# Patient Record
Sex: FEMALE | Race: WHITE | Employment: OTHER | URBAN - METROPOLITAN AREA
[De-identification: names, ages, dates, MRNs, and addresses within clinical notes are randomized per-mention and may not be internally consistent; named-entity substitution may affect disease eponyms.]

---

## 2019-04-23 ENCOUNTER — OFFICE VISIT (OUTPATIENT)
Dept: OBGYN CLINIC | Age: 74
End: 2019-04-23

## 2019-04-23 VITALS
WEIGHT: 189 LBS | HEIGHT: 63 IN | SYSTOLIC BLOOD PRESSURE: 124 MMHG | BODY MASS INDEX: 33.49 KG/M2 | DIASTOLIC BLOOD PRESSURE: 62 MMHG

## 2019-04-23 DIAGNOSIS — Z01.419 ENCOUNTER FOR ANNUAL ROUTINE GYNECOLOGICAL EXAMINATION: Primary | ICD-10-CM

## 2019-04-23 RX ORDER — GUAIFENESIN 100 MG/5ML
81 LIQUID (ML) ORAL DAILY
COMMUNITY

## 2019-04-23 RX ORDER — BIMATOPROST 0.1 MG/ML
SOLUTION/ DROPS OPHTHALMIC
Refills: 12 | COMMUNITY
Start: 2019-02-27

## 2019-04-23 NOTE — PATIENT INSTRUCTIONS

## 2019-04-23 NOTE — PROGRESS NOTES
[address] [facility]  [email or web address]  [phone]    Myrla Runner, MD, [address] [facility]       Annual Gynecologic Exam:   [facility] 60+  Chief Complaint   Patient presents with    Well Woman         Buffy Meléndez is a 72 y.o. No obstetric history on file. WHITE OR   female who presents for an annual exam.    She does not report additional concerns today. Sexual history and Contraception:  Social History     Substance and Sexual Activity   Sexual Activity Not Currently    Partners: Male    Comment: last SA- 2008     She does not reports new sexual partner(s) in the last year. Preventive Medicine History:  Her most recent Pap smear result: normal was obtained in 2015    She does not have a history of DOMINGO 2, 3 or cervical cancer. Breast health:  Last mammogram: was normal.   A mammogram was scheduled for today. Breast cancer family updated: see . Bone health: Nato Kadyandres She has had a bone density scan in the past. - 2010  Last bone density test results: within normal limits. She does not have a history of osteopenia/osteoporosis. Osteoporosis family history updated: see . Past Medical History:   Diagnosis Date    Glaucoma      Past Surgical History:   Procedure Laterality Date    HX ORTHOPAEDIC  1989    lower back surgery    HX ORTHOPAEDIC  2003    cervical neck surgery     History reviewed. No pertinent family history.   Social History     Socioeconomic History    Marital status:      Spouse name: Not on file    Number of children: Not on file    Years of education: Not on file    Highest education level: Not on file   Occupational History    Not on file   Social Needs    Financial resource strain: Not on file    Food insecurity:     Worry: Not on file     Inability: Not on file    Transportation needs:     Medical: Not on file     Non-medical: Not on file   Tobacco Use    Smoking status: Never Smoker    Smokeless tobacco: Never Used   Substance and Sexual Activity    Alcohol use: Never     Frequency: Never    Drug use: Never    Sexual activity: Not Currently     Partners: Male     Comment: last SA- 2008   Lifestyle    Physical activity:     Days per week: Not on file     Minutes per session: Not on file    Stress: Not on file   Relationships    Social connections:     Talks on phone: Not on file     Gets together: Not on file     Attends Rastafarian service: Not on file     Active member of club or organization: Not on file     Attends meetings of clubs or organizations: Not on file     Relationship status: Not on file    Intimate partner violence:     Fear of current or ex partner: Not on file     Emotionally abused: Not on file     Physically abused: Not on file     Forced sexual activity: Not on file   Other Topics Concern    Not on file   Social History Narrative    Not on file       Allergies   Allergen Reactions    Penicillins Anaphylaxis       Current Outpatient Medications   Medication Sig    LUMIGAN 0.01 % ophthalmic drops INSTILL 1 DROP INTO BOTH EYES EVERY DAY AT NIGHT    aspirin 81 mg chewable tablet Take 81 mg by mouth every seven (7) days. No current facility-administered medications for this visit. There is no problem list on file for this patient.       Review of Systems - History obtained from the patient  Constitutional: negative for weight loss, fever, night sweats  HEENT: negative for hearing loss, earache, congestion, snoring, sorethroat  CV: negative for chest pain, palpitations, edema  Resp: negative for cough, shortness of breath, wheezing  GI: negative for change in bowel habits, abdominal pain, black or bloody stools  : negative for frequency, dysuria, hematuria, vaginal discharge  MSK: negative for back pain, joint pain, muscle pain  Breast: negative for breast lumps, nipple discharge, galactorrhea  Skin :negative for itching, rash, hives  Neuro: negative for dizziness, headache, confusion, weakness  Psych: negative for anxiety, depression, change in mood  Heme/lymph: negative for bleeding, bruising, pallor    Physical Exam  Visit Vitals  /62   Ht 5' 3\" (1.6 m)   Wt 189 lb (85.7 kg)   BMI 33.48 kg/m²       Constitutional  · Appearance: well-nourished, well developed, alert, in no acute distress    HENT  · Head and Face: appears normal    Neck  · Inspection/Palpation: normal appearance, no masses or tenderness  · Lymph Nodes: no lymphadenopathy present  · Thyroid: gland size normal, nontender, no nodules or masses present on palpation    Chest  · Respiratory Effort: breathing unlabored  · Auscultation: normal breath sounds    Cardiovascular  · Heart:  · Auscultation: regular rate and rhythm without murmur    Breasts  · Inspection of Breasts: breasts symmetrical, no skin changes, no discharge present, nipple appearance normal, no skin retraction present  · Palpation of Breasts and Axillae: no masses present on palpation, no breast tenderness  · Axillary Lymph Nodes: no lymphadenopathy present    Gastrointestinal  · Abdominal Examination: abdomen non-tender to palpation, normal bowel sounds, no masses present  · Liver and spleen: no hepatomegaly present, spleen not palpable  · Hernias: no hernias identified    Genitourinary  · External Genitalia: normal appearance for age, no discharge present, no tenderness present, no inflammatory lesions present, no masses present, with atrophy present  · Vagina: normal vaginal vault without central or paravaginal defects, no discharge present, no inflammatory lesions present, no masses present  · Bladder: non-tender to palpation  · Urethra: appears normal  · Cervix: normal   · Uterus: normal size, shape and consistency  · Adnexa: no adnexal tenderness present, no adnexal masses present  · Perineum: perineum within normal limits, no evidence of trauma, no rashes or skin lesions present  · Anus: anus within normal limits, no hemorrhoids present  · Inguinal Lymph Nodes: no lymphadenopathy present    Skin  · General Inspection: no rash, no lesions identified    Neurologic/Psychiatric  · Mental Status:  · Orientation: grossly oriented to person, place and time  · Mood and Affect: mood normal, affect appropriate    Assessment:  72 y.o. No obstetric history on file. for well woman exam  Encounter Diagnosis   Name Primary?  Encounter for annual routine gynecological examination Yes       Plan:  The patient was counseled about diet, exercise, healthy lifestyle  We discussed current self breast exam and mammogram recommendations  We discussed current pap smear and HR HPV testing guidelines  We recommend follow up in one year for routine annual gynecologic exam or sooner if needed  We recommend follow up with a primary care physician for any chronic medical problems or non-gynecologic concerns    We discussed calcium/vitamin D/weight bearing exercise and osteoporosis prevention and bone density screening recommendations. Handouts were given to the patient    Rec BMD: pt will check w PCP  Disc pap screening: will try to get previous pap records [facility])  Pt req pap today     Folllow up:  [x] return for annual well woman exam in one year or sooner if she is having problems  [] follow up and ultrasound  [] mammogram  [] 6 months  [] 6 weeks   []     Orders Placed This Encounter    PAP IG, RFX APTIMA HPV ASCUS ([id number]))       No results found for any visits on 04/23/19.

## 2019-04-25 LAB
CYTOLOGIST CVX/VAG CYTO: NORMAL
CYTOLOGY CVX/VAG DOC THIN PREP: NORMAL
CYTOLOGY HISTORY:: NORMAL
DX ICD CODE: NORMAL
LABCORP, 190119: NORMAL
Lab: NORMAL
OTHER STN SPEC: NORMAL
PATH REPORT.FINAL DX SPEC: NORMAL
STAT OF ADQ CVX/VAG CYTO-IMP: NORMAL

## 2021-12-10 ENCOUNTER — TRANSCRIBE ORDER (OUTPATIENT)
Dept: SCHEDULING | Age: 76
End: 2021-12-10

## 2021-12-10 DIAGNOSIS — R42 DIZZINESS AND GIDDINESS: Primary | ICD-10-CM

## 2021-12-13 ENCOUNTER — HOSPITAL ENCOUNTER (INPATIENT)
Age: 76
LOS: 1 days | Discharge: HOME | DRG: 065 | End: 2021-12-15
Attending: EMERGENCY MEDICINE | Admitting: INTERNAL MEDICINE
Payer: MEDICARE

## 2021-12-13 DIAGNOSIS — I67.2 CEREBRAL ATHEROSCLEROSIS: ICD-10-CM

## 2021-12-13 DIAGNOSIS — E78.2 MIXED HYPERLIPIDEMIA: ICD-10-CM

## 2021-12-13 DIAGNOSIS — I63.9 ACUTE CVA (CEREBROVASCULAR ACCIDENT) (HCC): ICD-10-CM

## 2021-12-13 DIAGNOSIS — R42 DIZZINESS: ICD-10-CM

## 2021-12-13 DIAGNOSIS — R55 SYNCOPE AND COLLAPSE: Primary | ICD-10-CM

## 2021-12-13 LAB
ALBUMIN SERPL-MCNC: 3.9 G/DL (ref 3.5–5)
ALBUMIN/GLOB SERPL: 1 {RATIO} (ref 1.1–2.2)
ALP SERPL-CCNC: 86 U/L (ref 45–117)
ALT SERPL-CCNC: 13 U/L (ref 12–78)
ANION GAP SERPL CALC-SCNC: 10 MMOL/L (ref 5–15)
APPEARANCE UR: CLEAR
AST SERPL-CCNC: 15 U/L (ref 15–37)
BACTERIA URNS QL MICRO: NEGATIVE /HPF
BASOPHILS # BLD: 0.1 K/UL (ref 0–0.1)
BASOPHILS NFR BLD: 1 % (ref 0–1)
BILIRUB SERPL-MCNC: 0.5 MG/DL (ref 0.2–1)
BILIRUB UR QL: NEGATIVE
BUN SERPL-MCNC: 7 MG/DL (ref 6–20)
BUN/CREAT SERPL: 8 (ref 12–20)
CALCIUM SERPL-MCNC: 10.2 MG/DL (ref 8.5–10.1)
CHLORIDE SERPL-SCNC: 99 MMOL/L (ref 97–108)
CO2 SERPL-SCNC: 29 MMOL/L (ref 21–32)
COLOR UR: NORMAL
COMMENT, HOLDF: NORMAL
CREAT SERPL-MCNC: 0.91 MG/DL (ref 0.55–1.02)
DIFFERENTIAL METHOD BLD: ABNORMAL
EOSINOPHIL # BLD: 0.2 K/UL (ref 0–0.4)
EOSINOPHIL NFR BLD: 2 % (ref 0–7)
EPITH CASTS URNS QL MICRO: NORMAL /LPF
ERYTHROCYTE [DISTWIDTH] IN BLOOD BY AUTOMATED COUNT: 15.3 % (ref 11.5–14.5)
GLOBULIN SER CALC-MCNC: 4.1 G/DL (ref 2–4)
GLUCOSE SERPL-MCNC: 110 MG/DL (ref 65–100)
GLUCOSE UR STRIP.AUTO-MCNC: NEGATIVE MG/DL
HCT VFR BLD AUTO: 42 % (ref 35–47)
HGB BLD-MCNC: 13.7 G/DL (ref 11.5–16)
HGB UR QL STRIP: NEGATIVE
HYALINE CASTS URNS QL MICRO: NORMAL /LPF (ref 0–5)
IMM GRANULOCYTES # BLD AUTO: 0 K/UL (ref 0–0.04)
IMM GRANULOCYTES NFR BLD AUTO: 0 % (ref 0–0.5)
KETONES UR QL STRIP.AUTO: NEGATIVE MG/DL
LEUKOCYTE ESTERASE UR QL STRIP.AUTO: NEGATIVE
LYMPHOCYTES # BLD: 2.5 K/UL (ref 0.8–3.5)
LYMPHOCYTES NFR BLD: 21 % (ref 12–49)
MCH RBC QN AUTO: 31 PG (ref 26–34)
MCHC RBC AUTO-ENTMCNC: 32.6 G/DL (ref 30–36.5)
MCV RBC AUTO: 95 FL (ref 80–99)
MONOCYTES # BLD: 0.6 K/UL (ref 0–1)
MONOCYTES NFR BLD: 5 % (ref 5–13)
NEUTS SEG # BLD: 8.8 K/UL (ref 1.8–8)
NEUTS SEG NFR BLD: 71 % (ref 32–75)
NITRITE UR QL STRIP.AUTO: NEGATIVE
NRBC # BLD: 0 K/UL (ref 0–0.01)
NRBC BLD-RTO: 0 PER 100 WBC
PH UR STRIP: 7 [PH] (ref 5–8)
PLATELET # BLD AUTO: 475 K/UL (ref 150–400)
PMV BLD AUTO: 9.7 FL (ref 8.9–12.9)
POTASSIUM SERPL-SCNC: 4.5 MMOL/L (ref 3.5–5.1)
PROT SERPL-MCNC: 8 G/DL (ref 6.4–8.2)
PROT UR STRIP-MCNC: NEGATIVE MG/DL
RBC # BLD AUTO: 4.42 M/UL (ref 3.8–5.2)
RBC #/AREA URNS HPF: NORMAL /HPF (ref 0–5)
SAMPLES BEING HELD,HOLD: NORMAL
SODIUM SERPL-SCNC: 138 MMOL/L (ref 136–145)
SP GR UR REFRACTOMETRY: <1.005 (ref 1–1.03)
UA: UC IF INDICATED,UAUC: NORMAL
UROBILINOGEN UR QL STRIP.AUTO: 1 EU/DL (ref 0.2–1)
WBC # BLD AUTO: 12.2 K/UL (ref 3.6–11)
WBC URNS QL MICRO: NORMAL /HPF (ref 0–4)

## 2021-12-13 PROCEDURE — 93005 ELECTROCARDIOGRAM TRACING: CPT

## 2021-12-13 PROCEDURE — 81001 URINALYSIS AUTO W/SCOPE: CPT

## 2021-12-13 PROCEDURE — 84443 ASSAY THYROID STIM HORMONE: CPT

## 2021-12-13 PROCEDURE — 83036 HEMOGLOBIN GLYCOSYLATED A1C: CPT

## 2021-12-13 PROCEDURE — 80061 LIPID PANEL: CPT

## 2021-12-13 PROCEDURE — 85025 COMPLETE CBC W/AUTO DIFF WBC: CPT

## 2021-12-13 PROCEDURE — 80053 COMPREHEN METABOLIC PANEL: CPT

## 2021-12-13 PROCEDURE — 36415 COLL VENOUS BLD VENIPUNCTURE: CPT

## 2021-12-13 PROCEDURE — 99284 EMERGENCY DEPT VISIT MOD MDM: CPT

## 2021-12-13 PROCEDURE — 84484 ASSAY OF TROPONIN QUANT: CPT

## 2021-12-14 ENCOUNTER — HOSPITAL ENCOUNTER (OUTPATIENT)
Dept: MRI IMAGING | Age: 76
Discharge: HOME | DRG: 065 | End: 2021-12-14
Attending: INTERNAL MEDICINE
Payer: MEDICARE

## 2021-12-14 ENCOUNTER — APPOINTMENT (OUTPATIENT)
Dept: NON INVASIVE DIAGNOSTICS | Age: 76
DRG: 065 | End: 2021-12-14
Attending: INTERNAL MEDICINE
Payer: MEDICARE

## 2021-12-14 ENCOUNTER — APPOINTMENT (OUTPATIENT)
Dept: VASCULAR SURGERY | Age: 76
DRG: 065 | End: 2021-12-14
Attending: INTERNAL MEDICINE
Payer: MEDICARE

## 2021-12-14 ENCOUNTER — APPOINTMENT (OUTPATIENT)
Dept: NON INVASIVE DIAGNOSTICS | Age: 76
DRG: 065 | End: 2021-12-14
Attending: SPECIALIST
Payer: MEDICARE

## 2021-12-14 ENCOUNTER — APPOINTMENT (OUTPATIENT)
Dept: CT IMAGING | Age: 76
DRG: 065 | End: 2021-12-14
Attending: EMERGENCY MEDICINE
Payer: MEDICARE

## 2021-12-14 PROBLEM — R55 SYNCOPE AND COLLAPSE: Status: ACTIVE | Noted: 2021-12-14

## 2021-12-14 PROBLEM — R29.898 LEFT ARM WEAKNESS: Status: ACTIVE | Noted: 2021-12-14

## 2021-12-14 LAB
B PERT DNA SPEC QL NAA+PROBE: NOT DETECTED
BORDETELLA PARAPERTUSSIS PCR, BORPAR: NOT DETECTED
C PNEUM DNA SPEC QL NAA+PROBE: NOT DETECTED
CHOLEST SERPL-MCNC: 164 MG/DL
EST. AVERAGE GLUCOSE BLD GHB EST-MCNC: 105 MG/DL
FLUAV H1 2009 PAND RNA SPEC QL NAA+PROBE: NOT DETECTED
FLUAV H1 RNA SPEC QL NAA+PROBE: NOT DETECTED
FLUAV H3 RNA SPEC QL NAA+PROBE: NOT DETECTED
FLUAV SUBTYP SPEC NAA+PROBE: NOT DETECTED
FLUBV RNA SPEC QL NAA+PROBE: NOT DETECTED
HADV DNA SPEC QL NAA+PROBE: NOT DETECTED
HBA1C MFR BLD: 5.3 % (ref 4–5.6)
HCOV 229E RNA SPEC QL NAA+PROBE: NOT DETECTED
HCOV HKU1 RNA SPEC QL NAA+PROBE: NOT DETECTED
HCOV NL63 RNA SPEC QL NAA+PROBE: NOT DETECTED
HCOV OC43 RNA SPEC QL NAA+PROBE: NOT DETECTED
HDLC SERPL-MCNC: 49 MG/DL
HDLC SERPL: 3.3 {RATIO} (ref 0–5)
HMPV RNA SPEC QL NAA+PROBE: NOT DETECTED
HPIV1 RNA SPEC QL NAA+PROBE: NOT DETECTED
HPIV2 RNA SPEC QL NAA+PROBE: NOT DETECTED
HPIV3 RNA SPEC QL NAA+PROBE: NOT DETECTED
HPIV4 RNA SPEC QL NAA+PROBE: NOT DETECTED
LDLC SERPL CALC-MCNC: 92.2 MG/DL (ref 0–100)
M PNEUMO DNA SPEC QL NAA+PROBE: NOT DETECTED
RSV RNA SPEC QL NAA+PROBE: NOT DETECTED
RV+EV RNA SPEC QL NAA+PROBE: NOT DETECTED
SARS-COV-2 PCR, COVPCR: NOT DETECTED
TRIGL SERPL-MCNC: 114 MG/DL (ref ?–150)
TROPONIN-HIGH SENSITIVITY: 4 NG/L (ref 0–51)
TSH SERPL DL<=0.05 MIU/L-ACNC: 5 UIU/ML (ref 0.36–3.74)
VLDLC SERPL CALC-MCNC: 22.8 MG/DL

## 2021-12-14 PROCEDURE — 70450 CT HEAD/BRAIN W/O DYE: CPT

## 2021-12-14 PROCEDURE — 97162 PT EVAL MOD COMPLEX 30 MIN: CPT

## 2021-12-14 PROCEDURE — 93308 TTE F-UP OR LMTD: CPT | Performed by: INTERNAL MEDICINE

## 2021-12-14 PROCEDURE — 74011250636 HC RX REV CODE- 250/636

## 2021-12-14 PROCEDURE — 96374 THER/PROPH/DIAG INJ IV PUSH: CPT

## 2021-12-14 PROCEDURE — 96372 THER/PROPH/DIAG INJ SC/IM: CPT

## 2021-12-14 PROCEDURE — 96376 TX/PRO/DX INJ SAME DRUG ADON: CPT

## 2021-12-14 PROCEDURE — 74011250637 HC RX REV CODE- 250/637: Performed by: INTERNAL MEDICINE

## 2021-12-14 PROCEDURE — G0378 HOSPITAL OBSERVATION PER HR: HCPCS

## 2021-12-14 PROCEDURE — 74011250636 HC RX REV CODE- 250/636: Performed by: INTERNAL MEDICINE

## 2021-12-14 PROCEDURE — 70498 CT ANGIOGRAPHY NECK: CPT

## 2021-12-14 PROCEDURE — 96375 TX/PRO/DX INJ NEW DRUG ADDON: CPT

## 2021-12-14 PROCEDURE — 70551 MRI BRAIN STEM W/O DYE: CPT

## 2021-12-14 PROCEDURE — 74011000250 HC RX REV CODE- 250: Performed by: INTERNAL MEDICINE

## 2021-12-14 PROCEDURE — 99223 1ST HOSP IP/OBS HIGH 75: CPT | Performed by: PSYCHIATRY & NEUROLOGY

## 2021-12-14 PROCEDURE — 97535 SELF CARE MNGMENT TRAINING: CPT

## 2021-12-14 PROCEDURE — 97116 GAIT TRAINING THERAPY: CPT

## 2021-12-14 PROCEDURE — 0202U NFCT DS 22 TRGT SARS-COV-2: CPT

## 2021-12-14 PROCEDURE — 93880 EXTRACRANIAL BILAT STUDY: CPT

## 2021-12-14 PROCEDURE — 97530 THERAPEUTIC ACTIVITIES: CPT

## 2021-12-14 PROCEDURE — 74011000636 HC RX REV CODE- 636: Performed by: RADIOLOGY

## 2021-12-14 PROCEDURE — 97165 OT EVAL LOW COMPLEX 30 MIN: CPT

## 2021-12-14 RX ORDER — ONDANSETRON 2 MG/ML
4 INJECTION INTRAMUSCULAR; INTRAVENOUS
Status: DISCONTINUED | OUTPATIENT
Start: 2021-12-14 | End: 2021-12-15 | Stop reason: HOSPADM

## 2021-12-14 RX ORDER — ASPIRIN 325 MG
325 TABLET ORAL DAILY
Status: DISCONTINUED | OUTPATIENT
Start: 2021-12-15 | End: 2021-12-15

## 2021-12-14 RX ORDER — SODIUM CHLORIDE 0.9 % (FLUSH) 0.9 %
5-40 SYRINGE (ML) INJECTION EVERY 8 HOURS
Status: DISCONTINUED | OUTPATIENT
Start: 2021-12-14 | End: 2021-12-15 | Stop reason: HOSPADM

## 2021-12-14 RX ORDER — EPINEPHRINE 1 MG/ML
INJECTION, SOLUTION, CONCENTRATE INTRAVENOUS
Status: COMPLETED
Start: 2021-12-14 | End: 2021-12-14

## 2021-12-14 RX ORDER — ENOXAPARIN SODIUM 100 MG/ML
40 INJECTION SUBCUTANEOUS DAILY
Status: DISCONTINUED | OUTPATIENT
Start: 2021-12-14 | End: 2021-12-15 | Stop reason: HOSPADM

## 2021-12-14 RX ORDER — FACIAL-BODY WIPES
10 EACH TOPICAL DAILY PRN
Status: DISCONTINUED | OUTPATIENT
Start: 2021-12-14 | End: 2021-12-15 | Stop reason: HOSPADM

## 2021-12-14 RX ORDER — LORAZEPAM 2 MG/ML
INJECTION INTRAMUSCULAR
Status: DISCONTINUED
Start: 2021-12-14 | End: 2021-12-14 | Stop reason: WASHOUT

## 2021-12-14 RX ORDER — DIPHENHYDRAMINE HYDROCHLORIDE 50 MG/ML
25 INJECTION, SOLUTION INTRAMUSCULAR; INTRAVENOUS EVERY 6 HOURS
Status: DISPENSED | OUTPATIENT
Start: 2021-12-14 | End: 2021-12-14

## 2021-12-14 RX ORDER — PROMETHAZINE HYDROCHLORIDE 25 MG/1
12.5 TABLET ORAL
Status: DISCONTINUED | OUTPATIENT
Start: 2021-12-14 | End: 2021-12-15 | Stop reason: HOSPADM

## 2021-12-14 RX ORDER — ACETAMINOPHEN 325 MG/1
650 TABLET ORAL
Status: DISCONTINUED | OUTPATIENT
Start: 2021-12-14 | End: 2021-12-15 | Stop reason: HOSPADM

## 2021-12-14 RX ORDER — SODIUM CHLORIDE 0.9 % (FLUSH) 0.9 %
5-40 SYRINGE (ML) INJECTION AS NEEDED
Status: DISCONTINUED | OUTPATIENT
Start: 2021-12-14 | End: 2021-12-15 | Stop reason: HOSPADM

## 2021-12-14 RX ORDER — SODIUM CHLORIDE 9 MG/ML
25 INJECTION, SOLUTION INTRAVENOUS AS NEEDED
Status: DISCONTINUED | OUTPATIENT
Start: 2021-12-14 | End: 2021-12-15 | Stop reason: HOSPADM

## 2021-12-14 RX ORDER — PROCHLORPERAZINE EDISYLATE 5 MG/ML
10 INJECTION INTRAMUSCULAR; INTRAVENOUS
Status: DISCONTINUED | OUTPATIENT
Start: 2021-12-14 | End: 2021-12-15 | Stop reason: HOSPADM

## 2021-12-14 RX ORDER — HYDROMORPHONE HYDROCHLORIDE 1 MG/ML
0.5 INJECTION, SOLUTION INTRAMUSCULAR; INTRAVENOUS; SUBCUTANEOUS
Status: DISCONTINUED | OUTPATIENT
Start: 2021-12-14 | End: 2021-12-15 | Stop reason: HOSPADM

## 2021-12-14 RX ORDER — DIPHENHYDRAMINE HYDROCHLORIDE 50 MG/ML
INJECTION, SOLUTION INTRAMUSCULAR; INTRAVENOUS
Status: DISPENSED
Start: 2021-12-14 | End: 2021-12-14

## 2021-12-14 RX ORDER — SODIUM CHLORIDE 9 MG/ML
75 INJECTION, SOLUTION INTRAVENOUS CONTINUOUS
Status: DISCONTINUED | OUTPATIENT
Start: 2021-12-14 | End: 2021-12-15 | Stop reason: HOSPADM

## 2021-12-14 RX ORDER — IPRATROPIUM BROMIDE AND ALBUTEROL SULFATE 2.5; .5 MG/3ML; MG/3ML
3 SOLUTION RESPIRATORY (INHALATION)
Status: DISCONTINUED | OUTPATIENT
Start: 2021-12-14 | End: 2021-12-15 | Stop reason: HOSPADM

## 2021-12-14 RX ORDER — ACETAMINOPHEN 650 MG/1
650 SUPPOSITORY RECTAL
Status: DISCONTINUED | OUTPATIENT
Start: 2021-12-14 | End: 2021-12-15 | Stop reason: HOSPADM

## 2021-12-14 RX ORDER — GUAIFENESIN 100 MG/5ML
81 LIQUID (ML) ORAL DAILY
Status: DISCONTINUED | OUTPATIENT
Start: 2021-12-14 | End: 2021-12-14

## 2021-12-14 RX ADMIN — ENOXAPARIN SODIUM 40 MG: 100 INJECTION SUBCUTANEOUS at 08:26

## 2021-12-14 RX ADMIN — Medication 10 ML: at 11:52

## 2021-12-14 RX ADMIN — METHYLPREDNISOLONE SODIUM SUCCINATE 40 MG: 40 INJECTION, POWDER, FOR SOLUTION INTRAMUSCULAR; INTRAVENOUS at 01:33

## 2021-12-14 RX ADMIN — PROCHLORPERAZINE EDISYLATE 10 MG: 5 INJECTION INTRAMUSCULAR; INTRAVENOUS at 01:40

## 2021-12-14 RX ADMIN — FAMOTIDINE 20 MG: 10 INJECTION INTRAVENOUS at 20:27

## 2021-12-14 RX ADMIN — SODIUM CHLORIDE 75 ML/HR: 9 INJECTION, SOLUTION INTRAVENOUS at 02:32

## 2021-12-14 RX ADMIN — FAMOTIDINE 20 MG: 10 INJECTION INTRAVENOUS at 08:26

## 2021-12-14 RX ADMIN — FAMOTIDINE 20 MG: 10 INJECTION INTRAVENOUS at 01:33

## 2021-12-14 RX ADMIN — ASPIRIN 81 MG: 81 TABLET, CHEWABLE ORAL at 11:51

## 2021-12-14 RX ADMIN — EPINEPHRINE 0.3 MG: 1 INJECTION, SOLUTION, CONCENTRATE INTRAVENOUS at 01:45

## 2021-12-14 RX ADMIN — IOPAMIDOL 80 ML: 755 INJECTION, SOLUTION INTRAVENOUS at 00:16

## 2021-12-14 RX ADMIN — METHYLPREDNISOLONE SODIUM SUCCINATE 40 MG: 40 INJECTION, POWDER, FOR SOLUTION INTRAMUSCULAR; INTRAVENOUS at 05:32

## 2021-12-14 RX ADMIN — METHYLPREDNISOLONE SODIUM SUCCINATE 40 MG: 40 INJECTION, POWDER, FOR SOLUTION INTRAMUSCULAR; INTRAVENOUS at 11:51

## 2021-12-14 RX ADMIN — SODIUM CHLORIDE 75 ML/HR: 9 INJECTION, SOLUTION INTRAVENOUS at 14:16

## 2021-12-14 RX ADMIN — DIPHENHYDRAMINE HYDROCHLORIDE 25 MG: 50 INJECTION, SOLUTION INTRAMUSCULAR; INTRAVENOUS at 05:32

## 2021-12-14 RX ADMIN — DIPHENHYDRAMINE HYDROCHLORIDE 25 MG: 50 INJECTION, SOLUTION INTRAMUSCULAR; INTRAVENOUS at 17:10

## 2021-12-14 RX ADMIN — DIPHENHYDRAMINE HYDROCHLORIDE 25 MG: 50 INJECTION, SOLUTION INTRAMUSCULAR; INTRAVENOUS at 11:51

## 2021-12-14 RX ADMIN — Medication 10 ML: at 05:33

## 2021-12-14 NOTE — PROGRESS NOTES
Problem: Mobility Impaired (Adult and Pediatric)  Goal: *Acute Goals and Plan of Care (Insert Text)  Description: FUNCTIONAL STATUS PRIOR TO ADMISSION: Patient was independent and active without use of DME.    HOME SUPPORT PRIOR TO ADMISSION: The patient lived alone with no local support. Daughter present at evaluation. Patient drives her daughter's children to school everyday    Physical Therapy Goals  Initiated 12/14/2021  1. Patient will move from supine to sit and sit to supine  in bed with independence within 7 day(s). 2.  Patient will transfer from bed to chair and chair to bed with independence using the least restrictive device within 7 day(s). 3.  Patient will perform sit to stand with independence within 7 day(s). 4.  Patient will ambulate with independence for 150 feet with the least restrictive device within 7 day(s). 5.  Patient will ascend/descend 10 stairs with single handrail(s) with minimal assistance/contact guard assist within 7 day(s). Outcome: Not Met   PHYSICAL THERAPY EVALUATION- NEURO POPULATION  Patient: Frankey Cadet (51 y.o. female)  Date: 12/14/2021  Primary Diagnosis: Syncope and collapse [R55]        Precautions: fall         ASSESSMENT  Based on the objective data described below, the patient presents with decreased coordination, mildly impaired standing balance and functional mobility that appears to be fairly consistent with her baseline in the setting of hospital admission with concern for stroke. PTA patient was independent and active 69 yo who drives her grandchildren to/from school everyday. Patient has received care from multiple hospital settings in last month for intermittent episodes of dizziness, nausea and near syncopal spells. At this admission Head and neck CTA showing Bilateral vertebral arteries with severe atherosclerotic changes; as well as arthrosclerotic changes in  most other viewed arteries. MRI pending.  Pete LE strength and sensation intact and symmetrical. Patient transfers to/from bedside commode with SBA with intermittent hand held support needed on furniture. Anticipate patient is close to her functional baseline, PT to follow for 1-2 more sessions to assess for needs, progress ambulation. Recommend HHPT vs. None upon d/c. Current Level of Function Impacting Discharge (mobility/balance): SBA    Functional Outcome Measure: The patient scored Total: 38/56 on the Mcguire Balance Assessment which is indicative of moderate fall risk. Other factors to consider for discharge: none additional     Patient will benefit from skilled therapy intervention to address the above noted impairments. PLAN :  Recommendations and Planned Interventions: bed mobility training, transfer training, gait training, therapeutic exercises, patient and family training/education, and therapeutic activities      Frequency/Duration: Patient will be followed by physical therapy:  5 times a week to address goals.     Recommendation for discharge: (in order for the patient to meet his/her long term goals)  To be determined: HHPT vs. none    This discharge recommendation:  Has not yet been discussed the attending provider and/or case management    IF patient discharges home will need the following DME: none         SUBJECTIVE:   Patient stated .    OBJECTIVE DATA SUMMARY:   HISTORY:    Past Medical History:   Diagnosis Date    Glaucoma     History of mammography, screening 04/23/2019    Normal    Pap smear for cervical cancer screening 04/23/2019    Negative     Past Surgical History:   Procedure Laterality Date    HX ORTHOPAEDIC  1989    lower back surgery    HX ORTHOPAEDIC  2003    cervical neck surgery       Personal factors and/or comorbidities impacting plan of care: none additional    Home Situation  Home Environment: Private residence  One/Two Story Residence: Split level  # of Interior Steps: 7  Living Alone: Yes  Support Systems: Child(gretta)  Patient Expects to be Discharged to[de-identified] House  Current DME Used/Available at Home: None    EXAMINATION/PRESENTATION/DECISION MAKING:   Critical Behavior:  Neurologic State: (P) Alert  Orientation Level: (P) Oriented X4  Cognition: (P) Appropriate safety awareness, Appropriate for age attention/concentration, Appropriate decision making  Safety/Judgement: Awareness of environment  Hearing:     Skin:  all observed intact  Edema: none apparent   Range Of Motion:  AROM: Within functional limits                       Strength:    Strength: Within functional limits                    Tone & Sensation:   Tone: Normal              Sensation: Intact               Coordination:  Coordination: Generally decreased, functional  Vision:   Tracking: Able to track stimulus in all quadrants w/o difficulty  Visual Fields:  (WFLs)  Diplopia: No  Corrective Lenses: Glasses  Functional Mobility:  Bed Mobility:     Supine to Sit: Stand-by assistance     Scooting: Modified independent  Transfers:  Sit to Stand: Contact guard assistance; Assist x1  Stand to Sit: Contact guard assistance; Assist x1        Bed to Chair: Contact guard assistance              Balance:   Sitting: Intact; With support  Standing: Intact; Without support  Ambulation/Gait Training:  Distance (ft): 8 Feet (ft)  Assistive Device: Gait belt  Ambulation - Level of Assistance: Stand-by assistance; Assist x1        Gait Abnormalities: Decreased step clearance;  Path deviations        Base of Support: Narrowed        Step Length: Right shortened; Left shortened                        Functional Measure  Mcguire Balance Test:    Sitting to Standing: 3  Standing Unsupported: 4  Sitting with Back Unsupported: 4  Standing to Sittin  Transfers: 4  Standing Unsupported with Eyes Closed: 2  Standing Unsupported with Feet Together: 2  Reach Forward with Outstretched Arm: 3   Object: 3  Turn to Look Over Shoulders: 4  Turn 360 Degrees: 2  Alternate Foot on Step/Stool: 1  Standing Unsupported One Foot in Front: 0  Stand on One Le  Total: 38/56         56=Maximum possible score;   0-20=High fall risk  21-40=Moderate fall risk   41-56=Low fall risk        Physical Therapy Evaluation Charge Determination   History Examination Presentation Decision-Making   MEDIUM  Complexity : 1-2 comorbidities / personal factors will impact the outcome/ POC  MEDIUM Complexity : 3 Standardized tests and measures addressing body structure, function, activity limitation and / or participation in recreation  MEDIUM Complexity : Evolving with changing characteristics  MEDIUM Complexity : FOTO score of 26-74      Based on the above components, the patient evaluation is determined to be of the following complexity level: MEDIUM    Pain Rating:  Patient without reports of pain during therapy      Activity Tolerance:   Good and tolerates ADLs without rest breaks    After treatment patient left in no apparent distress:   Supine in bed, Call bell within reach, Caregiver / family present, and Side rails x 3    COMMUNICATION/EDUCATION:   The patients plan of care was discussed with: Occupational therapist, Registered nurse, and Physician. Patient was educated regarding her deficit(s) of balance/coordination as this relates to her diagnosis of concern for CVA. She demonstrated Fair understanding as evidenced by verbal feedback. Patient and/or family was verbally educated on the BE FAST acronym for signs/symptoms of CVA and TIA. Informed patient to refer to the Stroke Binder for further BE FAST information. All questions answered with patient indicating good understanding. Fall prevention education was provided and the patient/caregiver indicated understanding. and Patient/family have participated as able in goal setting and plan of care.     Thank you for this referral.  Bryan Grier PT, DPT   Time Calculation: 32 mins

## 2021-12-14 NOTE — ED PROVIDER NOTES
Please note that this dictation was completed with [employer], the computer voice recognition software.  Quite often unanticipated grammatical, syntax, homophones, and other interpretive errors are inadvertently transcribed by the computer software.  Please disregard these errors.  Please excuse any errors that have escaped final proofreading. 42-year-old female past medical history markable for glaucoma, history of normal mammogram, negative Pap smear and per her PCP Dr. Salomon Sutherland, for ER evaluations in the past week to 10 days for syncopal episodes versus near syncopal episodes. Per Dr. Aide Dotsno had Covid vaccine and negative Covid swab last week. She was went to Dr. Emma Fothergill office last Wednesday and was seen at [facility] ER previously the week prior with a negative evaluation. She evidently had an episode at the doctor's office last week and was sent to [address] [place] [address] negative evaluation. She returns today and per Dr. Walt Franco" does not look as good as she was she is orthostatic today. She sees Dr. Domingo Bennett cardiology at [facility]. She evidently has not been doing that well I feel like she needs to come there. She was due to have a CTA head neck and chest tomorrow anyway I think she needs to be admitted for repetitive syncopal episodes. \"    Per the patient \" the rooms not spinning or anything I just feel like I get dizzy and then passed out. I do not forget anything I remember everything. I feel fine right now I just had several episodes of this over the past several weeks and I feel like it is getting worse. \"    pt denies HA, vison changes, diff swallowing, CP, SOB, Abd pain, F/Ch, N/V, D/Cons or other current systemic complaints    Social/ PSH reviewed in EMR    EMR Chart Reviewed           Past Medical History:   Diagnosis Date    Glaucoma     History of mammography, screening 04/23/2019    Normal    Pap smear for cervical cancer screening 04/23/2019    Negative       Past Surgical History:   Procedure Laterality Date    HX ORTHOPAEDIC  1989    lower back surgery    HX ORTHOPAEDIC  2003    cervical neck surgery         No family history on file. Social History     Socioeconomic History    Marital status:      Spouse name: Not on file    Number of children: Not on file    Years of education: Not on file    Highest education level: Not on file   Occupational History    Not on file   Tobacco Use    Smoking status: Never Smoker    Smokeless tobacco: Never Used   Substance and Sexual Activity    Alcohol use: Never    Drug use: Never    Sexual activity: Not Currently     Partners: Male     Comment: last SA- 2008   Other Topics Concern    Not on file   Social History Narrative    Not on file     Social Determinants of Health     Financial Resource Strain:     Difficulty of Paying Living Expenses: Not on file   Food Insecurity:     Worried About Running Out of Food in the Last Year: Not on file    Francia of Food in the Last Year: Not on file   Transportation Needs:     Lack of Transportation (Medical): Not on file    Lack of Transportation (Non-Medical):  Not on file   Physical Activity:     Days of Exercise per Week: Not on file    Minutes of Exercise per Session: Not on file   Stress:     Feeling of Stress : Not on file   Social Connections:     Frequency of Communication with Friends and Family: Not on file    Frequency of Social Gatherings with Friends and Family: Not on file    Attends Protestant Services: Not on file    Active Member of Clubs or Organizations: Not on file    Attends Club or Organization Meetings: Not on file    Marital Status: Not on file   Intimate Partner Violence:     Fear of Current or Ex-Partner: Not on file    Emotionally Abused: Not on file    Physically Abused: Not on file    Sexually Abused: Not on file   Housing Stability:     Unable to Pay for Housing in the Last Year: Not on file    Number of [place] in the Last Year: Not on file    Unstable Housing in the Last Year: Not on file         ALLERGIES: Penicillins    Review of Systems   Constitutional: Negative for appetite change, chills and fever. HENT: Negative for trouble swallowing and voice change. Eyes: Negative for visual disturbance. Respiratory: Negative for cough, chest tightness and shortness of breath. Cardiovascular: Negative for chest pain, palpitations and leg swelling. Gastrointestinal: Negative for abdominal pain, diarrhea, nausea and vomiting. Genitourinary: Negative for dysuria. Musculoskeletal: Negative for back pain. Skin: Negative for rash. Neurological: Positive for dizziness, syncope and weakness. Psychiatric/Behavioral: Negative for confusion. All other systems reviewed and are negative. There were no vitals filed for this visit. Physical Exam  Vitals and nursing note reviewed. Constitutional:       General: She is not in acute distress. Appearance: Normal appearance. She is well-developed. She is obese. She is not ill-appearing, toxic-appearing or diaphoretic. HENT:      Head: Normocephalic and atraumatic. Comments: Cn intact grossly     Right Ear: External ear normal.      Left Ear: External ear normal.   Eyes:      General: No scleral icterus. Right eye: No discharge. Left eye: No discharge. Extraocular Movements: Extraocular movements intact. Conjunctiva/sclera: Conjunctivae normal.      Pupils: Pupils are equal, round, and reactive to light. Neck:      Vascular: No JVD. Trachea: No tracheal deviation. Cardiovascular:      Rate and Rhythm: Normal rate and regular rhythm. Pulses: Normal pulses. Heart sounds: Normal heart sounds. No murmur heard. No friction rub. No gallop. Pulmonary:      Effort: Pulmonary effort is normal. No respiratory distress. Breath sounds: Normal breath sounds. No wheezing or rales. Chest:      Chest wall: No tenderness.    Abdominal: General: Bowel sounds are normal.      Palpations: Abdomen is soft. Tenderness: There is no abdominal tenderness. There is no guarding or rebound. Genitourinary:     Vagina: No vaginal discharge. Comments: Pt denies urinary/ vaginal complaints  Musculoskeletal:         General: No tenderness. Normal range of motion. Cervical back: Normal range of motion and neck supple. No tenderness. Skin:     General: Skin is warm and dry. Capillary Refill: Capillary refill takes less than 2 seconds. Coloration: Skin is not pale. Findings: No bruising, erythema or rash. Neurological:      General: No focal deficit present. Mental Status: She is alert and oriented to person, place, and time. Cranial Nerves: No cranial nerve deficit. Sensory: No sensory deficit. Motor: No weakness or abnormal muscle tone. Coordination: Coordination normal.      Gait: Gait normal.      Deep Tendon Reflexes: Reflexes normal.   Psychiatric:         Behavior: Behavior normal.         Thought Content: Thought content normal.          MDM       Procedures    No chief complaint on file. 7:50 PM  The patients presenting problems have been discussed, and they are in agreement with the care plan formulated and outlined with them. I have encouraged them to ask questions as they arise throughout their visit. MEDICATIONS GIVEN:  Medications - No data to display    LABS REVIEWED:  Labs Reviewed - No data to display    RADIOLOGY RESULTS:  The following have been ordered and reviewed:  _____________________________________________________________________  _____________________________________________________________________    EKG interpretation:   Rhythm: normal sinus rhythm; and regular .  Rate (approx.): 80; Axis: normal; P wave: normal; QRS interval: normal ; ST/T wave: normal; Negative acute significant segmental elevations/ no old study for comparison/ no old study; PROCEDURES:        CONSULTATIONS:       PROGRESS NOTES:      DIAGNOSIS:    1. Syncope and collapse    2. Dizziness    3. Cerebral atherosclerosis        PLAN:  1- syncopal episodes/       ED COURSE: The patients hospital course has been uncomplicated. Perfect Serve Consult for Admission  11:55 PM    ED Room Number: CW/CW  Patient Name and age:  Chrissie Benton 70 y.o.  female  Working Diagnosis:   1.  Syncope and collapse        COVID-19 Suspicion:  no  Sepsis present:  no  Reassessment needed: yes  Code Status:  Full Code  Readmission: Yes  Isolation Requirements:  no  Recommended Level of Care:  telemetry  Department:[facility] ED - [phone]  Other:  4th er visit in 10 days/ several admissions at [facility]; Dr Gio Londono sent for admission/ further evaluation; has CTA H+N, Chest ordered for tomorrow;

## 2021-12-14 NOTE — PROGRESS NOTES
12/14/2021  2:00 PM  Case management note    Reason for Admission:  Syncope and collapse    Patient came to hospital for possible siezure and syncopal episode. Patient was having a CT and had allergic reaction to contrast. Patient was having CT for ongoing ataxia and dizziness for a couple of weeks. Patient lives alone, drives and uses a walker for ambulation. [facility]. OBS educated, letter signed and placed in chart. RUR Score:          low           Plan for utilizing home health:      Patient is independent and will most likely not qualify for home health services  PCP: First and Last name:  Kassie Alford DO     Name of Practice:    Are you a current patient: Yes/No: yes   Approximate date of last visit: 1 week   Can you participate in a virtual visit with your PCP:                     Current Advanced Directive/Advance Care Plan: Full Code, NO AD      Healthcare Decision Maker:   Mellisa Sotomayor daughter                     Transition of Care Plan:                      1. Home with family assistance  2. PCP follow up  3. AD planning  4. CM to follow for discharge needs    Care Management Interventions  Mode of Transport at Discharge:  Other (see comment)  Support Systems: Child(gretta)  Confirm Follow Up Transport: Family  The Plan for Transition of Care is Related to the Following Treatment Goals : syncope & collapse  Discharge Location  Discharge Placement: Home with family assistance  Nicanor Zee

## 2021-12-14 NOTE — ED TRIAGE NOTES
Pt arrives for c/o dizziness and near syncopal episodes since 12/4. States they have been seen at 4 different ERs in the last week and just want answers. States she has been told it is vertigo and a UTI. States she has been taking the medication for vertigo with no relief. She has a hx of vertigo and states fit feels like vertigo but isn't vertigo because the room isn't spinning around her that she is the one spinning. Pt is currently taking medication for a UTI. Pt adds that she has not actually passed out that she feel like she is and she gets nauseous. Pts FM states they were unable to get BP in the left arm and has had a difficult time palpating radial pulse in left arm. This RN unable to palpate radial pulse in the left arm or get BP. Left arm is warm and appropriate color. Sent by PCP, Shantelle Velarde to be admitted for syncope.

## 2021-12-14 NOTE — CONSULTS
I was asked to review CTA of the head and neck remotely for neuro endovascular consideration. I did not examine the patient this patient. I agree with the official report both vertebral arteries are diminutive and the right vertebral artery is occluded distally. The left vertebral artery exhibits multiple segmental stenoses throughout the cervical course. Although dissection cannot be excluded, the presence of a prominent anterior spinal artery collaterals suggests a chronic process. This patient has excellent posterior communicating artery collaterals and the basilar artery may be flowing retrograde given the bilateral vertebral disease. We will not consider stenting or other endovascular treatment (outside of acute neurological deterioration) due to risks of vessel to vessel basilar emboli. Standard of care would be medical therapy with 325 mg of aspirin p.o. daily. Plavix or dual antiplatelet therapy is an alternative consideration. Defer medical management to neurology.     Agree with MRI.    NORMA  [facility]  [phone]

## 2021-12-14 NOTE — PROGRESS NOTES
Chart reviewed. Cardiology evaluated. Follow up neurology rec and MRI head. Elevate tsh; check t4. Repeat labs.

## 2021-12-14 NOTE — CONSULTS
NEUROLOGY CONSULT NOTE    Patient ID:  Rafa Manjarrez  [id number]  36 y.o.  1945    Date of Consultation:  December 14, 2021    Referring Physician: Dr. Diaz Suero    Reason for Consultation:  dizziness    History of Present Illness:     Patient Active Problem List    Diagnosis Date Noted    Syncope and collapse 12/14/2021    Left arm weakness 12/14/2021     Past Medical History:   Diagnosis Date    Glaucoma     History of mammography, screening 04/23/2019    Normal    Pap smear for cervical cancer screening 04/23/2019    Negative      Past Surgical History:   Procedure Laterality Date    HX ORTHOPAEDIC  1989    lower back surgery    HX ORTHOPAEDIC  2003    cervical neck surgery      Prior to Admission medications    Medication Sig Start Date End Date Taking? Authorizing Provider   LUMIGAN 0.01 % ophthalmic drops INSTILL 1 DROP INTO BOTH EYES EVERY DAY AT NIGHT 2/27/19   Provider, Historical   aspirin 81 mg chewable tablet Take 81 mg by mouth every seven (7) days. Provider, Historical     Allergies   Allergen Reactions    Penicillins Anaphylaxis    Iodinated Contrast Media Shortness of Breath and Swelling      Social History     Tobacco Use    Smoking status: Never Smoker    Smokeless tobacco: Never Used   Substance Use Topics    Alcohol use: Never      Family History   Problem Relation Age of Onset    Heart Disease Mother     Heart Disease Father         Subjective:      Rafa Manjarrez is a 70 y.o. WF with a history of glaucoma who was admitted from the ER for intermittent episodes of dizziness, nausea and near syncopal spells for more than 1 week. Patient complains of dizziness, nausea and near syncopal episodes since 12/4/2021. Patient has been seen in 4 different ERs the last week and wants answers. She was told likely secondary to vertigo and UTI. Taking medications for vertigo with no relief.     Patient after receiving contrast dye at the CT scan developed lethargy was clammy, diaphoretic with slurred speech. Possible allergic reaction to contrast.  Solu-Medrol and Benadryl was administered. Patient also given epi intramuscularly. Patient is mentions her PCP having difficulty obtaining blood pressure on the left arm and difficulty palpating a pulse. Patient was sent to the ER for further evaluation. In the ER blood pressure was 113/71. Labs done revealed unremarkable urinalysis, troponin, lipid panel (LDL 92.2), respiratory virus panel with COVID-19 PCR and hemoglobin A1c. CBC showing increased WBC at 12.2 and increased platelet at 464. CMP showing increased glucose at 110 and increased calcium at 10.2. Elevated TSH at 5. Head and neck CTA with contrast revealed bilateral vertebral arteries are diminutive in caliber with severe atherosclerotic changes. Long segment occlusion in the right cervical vertebral artery. Underlying dissection is not excluded. Intracranial V4 segments are patent at the level of PICA. Moderate to severe atherosclerotic changes in the cavernous internal carotid artery. Moderate atherosclerosis in the left posterior cerebral artery. A few biapical lung nodules measuring up to 4 mm with patchy groundglass opacities. Head CT without contrast did not reveal any acute process. Periventricular white matter disease. Review of records revealed:  Patient was seen at [facility] cardiology 12/10/21  Started having episodic dizziness recently. Has R eye blurriness and been to eye center earlier this week; rec for cataract extraction. Stays dizzy all the time but when it gets worse she gets nausea. Fine went to bed last night. When lies down long time she gets dizzy. Got up 3 times to go bathroom last night and felt fine but when got up this AM she was staggering and felt dizzy unable to stand. Got in shower and got dizzy. It passed spontaneously. Riding in the car is worse with nausea. Worse tilting her head back.  Had vertigo years before and took dramaine with good relief. This feels similarly but the blurriness is new. No chest pain. Had cervical spine surgery in the past and has L arm numbness. She has posterior neck pain. Last few years the pain in her neck has gradually worsened. Rare headaches. Can no longer get BP in L arm due to lack of arm pulses. Sleeps a lot and is fatigued. This is unlike her. Taking dramamine and may contribute. Here with daughter who provider collateral history. Spoke to PCP about her condition prior to visit. Patient seen in PCP clinic few days prior after ED visits. (both local and ED visits) Labs reassuring. CT brain without ICP. CT chest without PE. She was orthostatic in the office and passed out upon standing. This prompted local ED visit. Tn normal. BNP normal. covid negative. Echo EF >55% moderate LVH    Patient also seen at [facility] in [place] and had negative head CT and positive urinalysis for UTI and was discharged on antibiotics. When seen, patient was still complaining of feeling that her tongue was swollen as well as her lips and some tightness in her throat from the episode last night. Mild slurring of her speech. Outside reports reviewed: office notes, ER records, radiology reports, lab reports, historical medical records. Review of Systems:    Pertinent items are noted in HPI. Objective:     Patient Vitals for the past 8 hrs:   BP Temp Pulse Resp SpO2   12/14/21 0900 125/67 97.8 °F (36.6 °C) 92 17 91 %   12/14/21 0800 112/78  92 16 97 %   12/14/21 0700 (!) 97/53  83 11 95 %   12/14/21 0456 121/66 97.2 °F (36.2 °C) 98 15 96 %   12/14/21 0305     97 %     PHYSICAL EXAM:    NEUROLOGICAL EXAM:    Appearance: The patient is obese, provides a coherent history and is in no acute distress. Mental Status: Oriented to time, place and person. Fluent, no aphasia. Mild  dysarthria. Mood and affect appropriate. Cranial Nerves:   Intact visual fields.  SANA, EOM's full, no nystagmus, no ptosis. Facial sensation is normal. Corneal reflexes are intact. Facial movement is symmetric. Decrease hearing bilaterally. Palate is midline with normal elevation. Sternocleidomastoid and trapezius muscles are normal. Tongue is midline. Motor:  5/5 strength in upper and lower proximal and distal muscles. Normal bulk and tone. No pronator drift. Reflexes:   Deep tendon reflexes 1+/4 and symmetrical. Downgoing toes. Sensory:   Normal to cold and vibration. Gait:  Not tested. Tremor:   No tremor noted. Cerebellar:  Intact FTN/ADRIANA/HTS.        Imaging  CT Head, head/neck CTA: reviewed    Lab Review    Recent Results (from the past 24 hour(s))   URINALYSIS W/ REFLEX CULTURE    Collection Time: 12/13/21  8:13 PM    Specimen: Urine   Result Value Ref Range    Color YELLOW/STRAW      Appearance CLEAR CLEAR      Specific gravity <1.005 1.003 - 1.030    pH (UA) 7.0 5.0 - 8.0      Protein Negative NEG mg/dL    Glucose Negative NEG mg/dL    Ketone Negative NEG mg/dL    Bilirubin Negative NEG      Blood Negative NEG      Urobilinogen 1.0 0.2 - 1.0 EU/dL    Nitrites Negative NEG      Leukocyte Esterase Negative NEG      WBC 0-4 0 - 4 /hpf    RBC 0-5 0 - 5 /hpf    Epithelial cells FEW FEW /lpf    Bacteria Negative NEG /hpf    UA:UC IF INDICATED CULTURE NOT INDICATED BY UA RESULT CNI      Hyaline cast 0-2 0 - 5 /lpf   CBC WITH AUTOMATED DIFF    Collection Time: 12/13/21 10:07 PM   Result Value Ref Range    WBC 12.2 (H) 3.6 - 11.0 K/uL    RBC 4.42 3.80 - 5.20 M/uL    HGB 13.7 11.5 - 16.0 g/dL    HCT 42.0 35.0 - 47.0 %    MCV 95.0 80.0 - 99.0 FL    MCH 31.0 26.0 - 34.0 PG    MCHC 32.6 30.0 - 36.5 g/dL    RDW 15.3 (H) 11.5 - 14.5 %    PLATELET 679 (H) 893 - 400 K/uL    MPV 9.7 8.9 - 12.9 FL    NRBC 0.0 0  WBC    ABSOLUTE NRBC 0.00 0.00 - 0.01 K/uL    NEUTROPHILS 71 32 - 75 %    LYMPHOCYTES 21 12 - 49 %    MONOCYTES 5 5 - 13 %    EOSINOPHILS 2 0 - 7 %    BASOPHILS 1 0 - 1 %    IMMATURE GRANULOCYTES 0 0.0 - 0.5 %    ABS. NEUTROPHILS 8.8 (H) 1.8 - 8.0 K/UL    ABS. LYMPHOCYTES 2.5 0.8 - 3.5 K/UL    ABS. MONOCYTES 0.6 0.0 - 1.0 K/UL    ABS. EOSINOPHILS 0.2 0.0 - 0.4 K/UL    ABS. BASOPHILS 0.1 0.0 - 0.1 K/UL    ABS. IMM. GRANS. 0.0 0.00 - 0.04 K/UL    DF AUTOMATED     METABOLIC PANEL, COMPREHENSIVE    Collection Time: 12/13/21 10:07 PM   Result Value Ref Range    Sodium 138 136 - 145 mmol/L    Potassium 4.5 3.5 - 5.1 mmol/L    Chloride 99 97 - 108 mmol/L    CO2 29 21 - 32 mmol/L    Anion gap 10 5 - 15 mmol/L    Glucose 110 (H) 65 - 100 mg/dL    BUN 7 6 - 20 MG/DL    Creatinine 0.91 0.55 - 1.02 MG/DL    BUN/Creatinine ratio 8 (L) 12 - 20      GFR est AA >60 >60 ml/min/1.73m2    GFR est non-AA >60 >60 ml/min/1.73m2    Calcium 10.2 (H) 8.5 - 10.1 MG/DL    Bilirubin, total 0.5 0.2 - 1.0 MG/DL    ALT (SGPT) 13 12 - 78 U/L    AST (SGOT) 15 15 - 37 U/L    Alk. phosphatase 86 45 - 117 U/L    Protein, total 8.0 6.4 - 8.2 g/dL    Albumin 3.9 3.5 - 5.0 g/dL    Globulin 4.1 (H) 2.0 - 4.0 g/dL    A-G Ratio 1.0 (L) 1.1 - 2.2     SAMPLES BEING HELD    Collection Time: 12/13/21 10:07 PM   Result Value Ref Range    SAMPLES BEING HELD RED. GRN     COMMENT        Add-on orders for these samples will be processed based on acceptable specimen integrity and analyte stability, which may vary by analyte.    TSH 3RD GENERATION    Collection Time: 12/13/21 10:07 PM   Result Value Ref Range    TSH 5.00 (H) 0.36 - 3.74 uIU/mL   HEMOGLOBIN A1C WITH EAG    Collection Time: 12/13/21 10:07 PM   Result Value Ref Range    Hemoglobin A1c 5.3 4.0 - 5.6 %    Est. average glucose 105 mg/dL   LIPID PANEL    Collection Time: 12/13/21 10:07 PM   Result Value Ref Range    Cholesterol, total 164 <200 MG/DL    Triglyceride 114 <150 MG/DL    HDL Cholesterol 49 MG/DL    LDL, calculated 92.2 0 - 100 MG/DL    VLDL, calculated 22.8 MG/DL    CHOL/HDL Ratio 3.3 0.0 - 5.0     TROPONIN-HIGH SENSITIVITY    Collection Time: 12/13/21 11:55 PM   Result Value Ref Range Troponin-High Sensitivity 4 0 - 51 ng/L   RESPIRATORY VIRUS PANEL W/COVID-19, PCR    Collection Time: 12/14/21 12:50 AM    Specimen: Nasopharyngeal   Result Value Ref Range    Adenovirus Not detected NOTD      Coronavirus 229E Not detected NOTD      Coronavirus HKU1 Not detected NOTD      Coronavirus CVNL63 Not detected NOTD      Coronavirus OC43 Not detected NOTD      SARS-CoV-2, PCR Not detected NOTD      Metapneumovirus Not detected NOTD      Rhinovirus and Enterovirus Not detected NOTD      Influenza A Not detected NOTD      Influenza A, subtype H1 Not detected NOTD      Influenza A, subtype H3 Not detected NOTD      INFLUENZA A H1N1 PCR Not detected NOTD      Influenza B Not detected NOTD      Parainfluenza 1 Not detected NOTD      Parainfluenza 2 Not detected NOTD      Parainfluenza 3 Not detected NOTD      Parainfluenza virus 4 Not detected NOTD      RSV by PCR Not detected NOTD      B. parapertussis, PCR Not detected NOTD      Bordetella pertussis - PCR Not detected NOTD      Chlamydophila pneumoniae DNA, QL, PCR Not detected NOTD      Mycoplasma pneumoniae DNA, QL, PCR Not detected NOTD           Assessment:   Dizziness  Syncope and collapse  Cerebral atherosclerotic diseases    Plan:   Neurological examination reveals just mild slurred speech but no other findings. Suspect likely related still to improving adverse reaction to the CT contrast dye. Need to also rule out possible brainstem or cerebellar stroke. Head CT without contrast did not reveal any acute process. Periventricular white matter disease. Brain MRI is pending. Head and neck CTA with contrast revealed bilateral vertebral arteries are diminutive in caliber with severe atherosclerotic changes. Long segment occlusion in the right cervical vertebral artery. Underlying dissection is not excluded. Intracranial V4 segments are patent at the level of PICA.   Moderate to severe atherosclerotic changes in the cavernous internal carotid artery. Moderate atherosclerosis in the left posterior cerebral artery. We will reach out to neuro interventional service and opine as to any intervention that can be done or mainly antiplatelet therapy. Dizziness could be secondary to vertebrobasilar insufficiency. Need to rule out subtle brainstem or cerebellar stroke. Baseline evaluation by speech, PT and OT. Echocardiogram and carotid Doppler studies are pending. LDL was 92.2. If stroke is confirmed then this has to be less than 70 and will need statin therapy. Continue aspirin 81 mg daily for stroke prophylaxis. Further intervention be done pending results of testing. Thank you for the consult. 70 minutes was spent with this patient. This included review of her outside medical records as summarized above, evaluation of the patient, formulation of treatment plan, answering all questions and concerns. This note was created using voice recognition software. Despite editing, there may be syntax errors.

## 2021-12-14 NOTE — CONSULTS
Nicky Morgan MD, [address] [place] [address]  Pavel Coello 35  [phone]    Date of  Admission: 12/13/2021  9:34 PM         IMPRESSION and RECOMMENDATIONS     1. Lightheadedness: No orthostatics yet. No cardiac etiology thus far. Echo (12/10/21) at [facility] showed normal LVF (EF 55%) and mild to mod LVH without valve abnormalities. I don't see that a bubble study was done. Will change echo to limited for bubble study. No evidence of ischemia, CHF, or dysrhythmia. 2.  Cerebrovascular disease: Mod to sev atherosclerosis of cavernous ICAs. LDL 92 on no meds. Will defer initiation of statin to neuro as I do not feel strongly about this. Cont ASA 81mg every day. 3.  LUE numbness:  She has had C-spine surgery, and I wonder if her symptoms represent radiculopathy. I have discussed this plan with the patient. She appears to understand this plan and wishes to proceed ahead. Problem List  Date Reviewed: 12/14/2021          Codes Class Noted    * (Principal) Syncope and collapse ICD-10-CM: R55  ICD-9-CM: 780.2  12/14/2021        Left arm weakness ICD-10-CM: R29.898  ICD-9-CM: 729.89  12/14/2021              History of Present Illness:     Robert Andrea is a 70 y.o. female with the above problem list who was admitted for Syncope and collapse [R55]. The patient is a 71 yo otherwise healthy, presented w/ pre-syncope, ataxia, L arm weakness. The patient c/o dizziness and intermittent pre-syncope for 2 weeks. She also c/o worsening ataxia and L arm weakness. Her PCP has set up for an outpatient head/neck CT and chest CT for tomorrow, but her daughter brought her into the ED tonight. Denied chest pain, SOB, fevers, chills, nausea, vomiting, diarrhea. She denies chest pain/discomfort, shortness of breath, dyspnea on exertion, orthopnea, paroxysmal noctural dyspnea, lower extremity edema, palpitations, syncope.     Current Facility-Administered Medications   Medication Dose Route Frequency    0.9% sodium chloride infusion  75 mL/hr IntraVENous CONTINUOUS    prochlorperazine (COMPAZINE) injection 10 mg  10 mg IntraVENous Q6H PRN    HYDROmorphone (DILAUDID) syringe 0.5 mg  0.5 mg IntraVENous Q4H PRN    aspirin chewable tablet 81 mg  81 mg Oral DAILY    sodium chloride (NS) flush 5-40 mL  5-40 mL IntraVENous Q8H    sodium chloride (NS) flush 5-40 mL  5-40 mL IntraVENous PRN    0.9% sodium chloride infusion 25 mL  25 mL IntraVENous PRN    acetaminophen (TYLENOL) tablet 650 mg  650 mg Oral Q6H PRN    Or    acetaminophen (TYLENOL) suppository 650 mg  650 mg Rectal Q6H PRN    bisacodyL (DULCOLAX) suppository 10 mg  10 mg Rectal DAILY PRN    promethazine (PHENERGAN) tablet 12.5 mg  12.5 mg Oral Q6H PRN    Or    ondansetron (ZOFRAN) injection 4 mg  4 mg IntraVENous Q6H PRN    enoxaparin (LOVENOX) injection 40 mg  40 mg SubCUTAneous DAILY    methylPREDNISolone (PF) (Solu-MEDROL) 125 mg/2 mL injection        albuterol-ipratropium (DUO-NEB) 2.5 MG-0.5 MG/3 ML  3 mL Nebulization Q4H PRN    diphenhydrAMINE (BENADRYL) injection 25 mg  25 mg IntraVENous Q6H    methylPREDNISolone (PF) (SOLU-MEDROL) injection 40 mg  40 mg IntraVENous Q6H    famotidine (PF) (PEPCID) 20 mg in 0.9% sodium chloride 10 mL injection  20 mg IntraVENous Q12H     Current Outpatient Medications   Medication Sig    LUMIGAN 0.01 % ophthalmic drops INSTILL 1 DROP INTO BOTH EYES EVERY DAY AT NIGHT    aspirin 81 mg chewable tablet Take 81 mg by mouth every seven (7) days.       Allergies   Allergen Reactions    Penicillins Anaphylaxis    Iodinated Contrast Media Shortness of Breath and Swelling      Family History   Problem Relation Age of Onset    Heart Disease Mother     Heart Disease Father       Social History     Socioeconomic History    Marital status:      Spouse name: Not on file    Number of children: Not on file    Years of education: Not on file    Highest education level: Not on file   Occupational History    Not on file   Tobacco Use    Smoking status: Never Smoker    Smokeless tobacco: Never Used   Substance and Sexual Activity    Alcohol use: Never    Drug use: Never    Sexual activity: Not Currently     Partners: Male     Comment: last SA- 2008   Other Topics Concern    Not on file   Social History Narrative    Not on file     Social Determinants of Health     Financial Resource Strain:     Difficulty of Paying Living Expenses: Not on file   Food Insecurity:     Worried About Running Out of Food in the Last Year: Not on file    Francia of Food in the Last Year: Not on file   Transportation Needs:     Lack of Transportation (Medical): Not on file    Lack of Transportation (Non-Medical):  Not on file   Physical Activity:     Days of Exercise per Week: Not on file    Minutes of Exercise per Session: Not on file   Stress:     Feeling of Stress : Not on file   Social Connections:     Frequency of Communication with Friends and Family: Not on file    Frequency of Social Gatherings with Friends and Family: Not on file    Attends [employer] Services: Not on file    Active Member of [address] [place] [address] or Organizations: Not on file    Attends Club or Organization Meetings: Not on file    Marital Status: Not on file   Intimate Partner Violence:     Fear of Current or Ex-Partner: Not on file    Emotionally Abused: Not on file    Physically Abused: Not on file    Sexually Abused: Not on file   Housing Stability:     Unable to Pay for Housing in the Last Year: Not on file    Number of [place] in the Last Year: Not on file    Unstable Housing in the Last Year: Not on file       Physical Exam:     Patient Vitals for the past 16 hrs:   BP Temp Pulse Resp SpO2 Height Weight   12/14/21 0900 125/67 97.8 °F (36.6 °C) 92 17 91 %     12/14/21 0800 112/78  92 16 97 %     12/14/21 0700 (!) 97/53  83 11 95 %     12/14/21 0456 121/66 97.2 °F (36.2 °C) 98 15 96 %     12/14/21 0305     97 %     12/14/21 0145 (!) 143/73  98       12/14/21 0026     97 %     12/13/21 2237 128/63 97.5 °F (36.4 °C) 72 17 97 %     12/13/21 2118 107/66 97.9 °F (36.6 °C) 77 16 97 %     12/13/21 2008 113/71 97.5 °F (36.4 °C) 78 16 95 % 5' 2\" (1.575 m) 74.8 kg (165 lb)       HEENT Exam:     Normocephalic, atraumatic. EOMI. Oropharynx negative. Neck supple. No lymphadenopathy. Lung Exam:     The patient is not dyspneic. There is no cough. Breath sounds are heard equally in all lung fields. There are no wheezes, rales, rhonchi, or rubs heard on auscultation. Heart Exam:     The rhythm is regular. The PMI is in the 5th intercostal space of the MCL. Apical impulse is normal. S1 is regular. S2 is physiologic. There is no S3, S4 gallop, murmur, click, or rub. Abdomen Exam:     Bowel sounds are normoactive. Abdomen soft in all quadrants. No tenderness. No palpable masses. No organomegaly. No hernias noted. No bruits or pulsatile mass. Extremities Exam:     The extremities are atraumatic appearing. There is no clubbing, cyanosis, edema, ulcers, varicose veins, rash, erythemia noted in the extremities. The neurovascular status is grossly intact with normal distal sensation and pulses. Vascular Exam:     The radial, brachial, dorsalis pedis, posterior tibial, are equal and strong bilaterally The carotids are equal bilaterally without bruits.           Labs:     Lab Results   Component Value Date/Time    Glucose 110 (H) 12/13/2021 10:07 PM    Sodium 138 12/13/2021 10:07 PM    Potassium 4.5 12/13/2021 10:07 PM    Chloride 99 12/13/2021 10:07 PM    CO2 29 12/13/2021 10:07 PM    BUN 7 12/13/2021 10:07 PM    Creatinine 0.91 12/13/2021 10:07 PM    Calcium 10.2 (H) 12/13/2021 10:07 PM     Recent Labs     12/13/21  2207   WBC 12.2*   HGB 13.7   HCT 42.0   *     Recent Labs     12/13/21  2207   ALT 13   AP 86   TBILI 0.5   TP 8. 0   ALB 3.9   GLOB 4.1*     No results for input(s): INR, PTP, APTT, INREXT in the last 72 hours. No results for input(s): CPK, CKMB, TNIPOC in the last 72 hours. No lab exists for component: TROPONINI, ITNL  No results for input(s): TROIQ in the last 72 hours.   Lab Results   Component Value Date/Time    Cholesterol, total 164 12/13/2021 10:07 PM    HDL Cholesterol 49 12/13/2021 10:07 PM    LDL, calculated 92.2 12/13/2021 10:07 PM    Triglyceride 114 12/13/2021 10:07 PM    CHOL/HDL Ratio 3.3 12/13/2021 10:07 PM       EKG:  NSR @ 79

## 2021-12-14 NOTE — PROGRESS NOTES
Problem: Self Care Deficits Care Plan (Adult)  Goal: *Acute Goals and Plan of Care (Insert Text)  Description: FUNCTIONAL STATUS PRIOR TO ADMISSION: Patient was independent and active without use of DME. Patient was independent for basic and instrumental ADLs. HOME SUPPORT: The patient lived alone with family locally to provide assistance. Pt drives her grandchildren to school everyday. Occupational Therapy Goals  Initiated 12/14/2021  1. Patient will perform grooming, standing at sink for >2 minutes, with modified independence within 7 day(s). 2.  Patient will perform bathing, sitting and standing PRN, with modified independence within 7 day(s). 3.  Patient will perform all aspects of toileting with modified independence within 7 day(s). 4.  Patient will participate in upper extremity therapeutic exercise/activities with modified independence for 10 minutes within 7 day(s). 5.  Patient will utilize energy conservation techniques during functional activities with verbal cues within 7 day(s). Outcome: Progressing Towards Goal     OCCUPATIONAL THERAPY EVALUATION  Patient: Nikhil Hopper (89 y.o. female)  Date: 12/14/2021  Primary Diagnosis: Syncope and collapse [R55]       Precautions: Fall       ASSESSMENT  Based on the objective data described below, the patient presents with mildly impaired coordination, mildly impaired standing balance, and decreased safety with ADLs following admission for dizziness and work-up for CVA. MRI pending at time of evaluation and pt cleared to participate. She is received on stretcher in ED with supportive dtr present. Pt reports residual tongue and lip swelling from concern for allergic reaction to contrast yesterday. She is able to demonstrate SPT to [facility] with overall SBA and manages toileting hygiene with supervision/SBA.  She demonstrates intact and equal strength, ROM, and coordination in UEs to complete ADL tasks, including good reach to distal LEs for sock mgmt. Anticipate pt is very near her baseline and will likely only require 1-2 more OT sessions. No follow up OT services indicated at discharge. Current Level of Function Impacting Discharge (ADLs/self-care): up to [address] for ADLs    Functional Outcome Measure: The patient scored Total A-D  Total A-D (Motor Function): 66/66 on the Fugl-Cazares Assessment which is indicative of no impairment in upper extremity functional status. Other factors to consider for discharge: lives alone     Patient will benefit from skilled therapy intervention to address the above noted impairments. PLAN :  Recommendations and Planned Interventions: self care training, functional mobility training, therapeutic exercise, balance training, therapeutic activities, cognitive retraining, endurance activities, patient education, home safety training and family training/education    Frequency/Duration: Patient will be followed by occupational therapy 5 times a week to address goals. Recommendation for discharge: (in order for the patient to meet his/her long term goals)  No skilled occupational therapy/ follow up rehabilitation needs identified at this time. This discharge recommendation:  Has been made in collaboration with the attending provider and/or case management    IF patient discharges home will need the following DME: anticipate none       SUBJECTIVE:   Patient stated I drive over 316 miles every day to take my grandkids to school.     OBJECTIVE DATA SUMMARY:   HISTORY:   Past Medical History:   Diagnosis Date    Glaucoma     History of mammography, screening 04/23/2019    Normal    Pap smear for cervical cancer screening 04/23/2019    Negative     Past Surgical History:   Procedure Laterality Date    HX ORTHOPAEDIC  1989    lower back surgery    HX ORTHOPAEDIC  2003    cervical neck surgery       Expanded or extensive additional review of patient history:     Home Situation  Home Environment: Private residence  One/Two Story Residence: [facility]  # of Interior Steps: 7  Living Alone: Yes  Support Systems: Child(gretta)  Patient Expects to be Discharged toVF Cor[de-identified]ration  Current DME Used/Available at Home: None    Hand dominance: Right    EXAMINATION OF PERFORMANCE DEFICITS:  Cognitive/Behavioral Status:  Neurologic State: Alert  Orientation Level: Oriented X4  Cognition: Appropriate safety awareness; Appropriate for age attention/concentration; Appropriate decision making  Perception: Appears intact  Perseveration: No perseveration noted  Safety/Judgement: Awareness of environment    Hearing: Auditory  Auditory Impairment: Hard of hearing, bilateral    Vision/Perceptual:    Tracking: Able to track stimulus in all quadrants w/o difficulty    Visual Fields:  (WFLs)  Diplopia: No    Corrective Lenses: Glasses    Range of Motion:  AROM: Within functional limits    Strength:  Strength: Within functional limits    Coordination:  Coordination: Generally decreased, functional  Fine Motor Skills-Upper: Left Intact; Right Intact    Gross Motor Skills-Upper: Left Intact; Right Intact    Tone & Sensation:  Tone: Normal  Sensation: Intact    Balance:  Sitting: Intact; With support  Standing: Intact;  Without support    Functional Mobility and Transfers for ADLs:  Bed Mobility:  Supine to Sit: Stand-by assistance  Scooting: Modified independent    Transfers:  Sit to Stand: Contact guard assistance; Assist x1  Stand to Sit: Contact guard assistance; Assist x1  Bed to Chair: Contact guard assistance  Toilet Transfer : Contact guard assistance    ADL Assessment:  Feeding: Independent    Oral Facial Hygiene/Grooming: Supervision    Bathing: Contact guard assistance    Upper Body Dressing: Independent    Lower Body Dressing: Setup; Stand-by assistance    Toileting: Supervision; Stand by assistance    ADL Intervention and task modifications:  Lower Body Dressing Assistance  Socks: Set-up  Leg Crossed Method Used: Yes  Position Performed: Long sitting on bed  Cues: Redge Cammie  Bladder Hygiene: Supervision  Clothing Management: Stand-by assistance    Cognitive Retraining  Safety/Judgement: Awareness of environment    Functional Measure:  Fugl-Cazares Assessment of Motor Recovery after Stroke:   Reflex Activity  Flexors/Biceps/Fingers: Can be elicited  Extensors/Triceps: Can be elicited  Reflex Subtotal: 4    Volitional Movement Within Synergies  Shoulder Retraction: Full  Shoulder Elevation: Full  Shoulder Abduction (90 degrees): Full  Shoulder External Rotation: Full  Elbow Flexion: Full  Forearm Supination: Full  Shoulder Adduction/Internal Rotation: Full  Elbow Extension: Full  Forearm Pronation: Full  Subtotal: 18    Volitional Movement Mixing Synergies  Hand to Lumbar Spine: Full  Shoulder Flexion (0-90 degrees): Full  Pronation-Supination: Full  Subtotal: 6    Volitional Movement With Little or No Synergy  Shoulder Abduction (0-90 degrees): Full  Shoulder Flexion ( degrees): Full  Pronation/Supination: Full  Subtotal : 6    Normal Reflex Activity  Biceps, Triceps, Finger Flexors: Full  Subtotal : 2    Upper Extremity Total   Upper Extremity Total: 36    Wrist  Stability at 15 Degree Dorsiflexion: Full  Repeated Dorsiflexion/ Volar Flexion: Full  Stability at 15 Degree Dorsiflexion: Full  Repeated Dorsiflexion/ Volar Flexion: Full  Circumduction: Full  Wrist Total: 10    Hand  Mass Flexion: Full  Mass Extension: Full  Grasp A: Full  Grasp B: Full  Grasp C: Full  Grasp D: Full  Grasp E: Full  Hand Total: 14    Coordination/Speed  Tremor: None  Dysmetria: None  Time: <1s  Coordination/Speed Total : 6    Total A-D  Total A-D (Motor Function): 66/66     This is a reliable/valid measure of arm function after a neurological event. It has established value to characterize functional status and for measuring spontaneous and therapy-induced recovery; tests proximal and distal motor functions.  Fugl-Cazares Assessment  UE scores recorded between five and 30 days post neurologic event can be used to predict UE recovery at six months post neurologic event. Severe = 0-21 points   Moderately Severe = 22-33 points   Moderate = 34-47 points   Mild = 48-66 points  VINICIO Oliveira, HANNAH Galan, & RACHEL Redman (1992). Measurement of motor recovery after stroke: Outcome assessment and sample size requirements. Stroke, 23, pp. 9161-1829.   ------------------------------------------------------------------------------------------------------------------------------------------------------------------  MCID:  Stroke:   Sharon Posey et al, 2001; n = 171; mean age 79 (5) years; assessed within 16 (12) days of stroke, Acute Stroke)  FMA Motor Scores from Admission to Discharge    10 point increase in FMA Upper Extremity = 1.5 change in discharge FIM    10 point increase in FMA Lower Extremity = 1.9 change in discharge FIM  MDC:   Stroke:   Ana Harper et al, 2008, n = 14, mean age = 59.9 (14.6) years, assessed on average 14 (6.5) months post stroke, Chronic Stroke)    FMA = 5.2 points for the Upper Extremity portion of the assessment     Occupational Therapy Evaluation Charge Determination   History Examination Decision-Making   LOW Complexity : Brief history review  LOW Complexity : 1-3 performance deficits relating to physical, cognitive , or psychosocial skils that result in activity limitations and / or participation restrictions  MEDIUM Complexity : Patient may present with comorbidities that affect occupational performnce.  Miniml to moderate modification of tasks or assistance (eg, physical or verbal ) with assesment(s) is necessary to enable patient to complete evaluation       Based on the above components, the patient evaluation is determined to be of the following complexity level: LOW   Pain Rating:  Pt denied pain    Activity Tolerance:   Fair    After treatment patient left in no apparent distress:    Supine in bed, Call bell within reach, Caregiver / family present and Side rails x 3    COMMUNICATION/EDUCATION:   The patients plan of care was discussed with: Physical therapist and Registered nurse. Home safety education was provided and the patient/caregiver indicated understanding., Patient/family have participated as able in goal setting and plan of care. and Patient/family agree to work toward stated goals and plan of care. This patients plan of care is appropriate for delegation to hospitals.     Thank you for this referral.  Philly Loredo OT  Time Calculation: 29 mins

## 2021-12-14 NOTE — ED NOTES
Responded to CT after tech called for possible seizure. Pt had received some amount of contrast before this RN arrived. Pt found to be lethargic,clammy, diaphoretic, and slurred speech. Dr. Irma Jones assessed pt. Pt was brought back to ER for possible allergic reaction to contrast. Dr. Francisca Page was consulted, solumedrol and benadryl administered with no relief. Consulted Dr Francisca Page again and ordered epi IM. Epi was administered and will continue to monitor.  2 L NC placed on pt for comfort

## 2021-12-14 NOTE — H&P
Maurice Gaspar [facility]  [address] [facility], [address] [place] [address]  [phone]    Admission History and Physical      NAME:  Saturnino Jones   :   1945   MRN:  [id number]     PCP:  Pamela Yap DO     Date/Time of service:  2021  12:35 AM        Subjective:     CHIEF COMPLAINT: weakness, pre-syncope     HISTORY OF PRESENT ILLNESS:     The patient is a 69 yo otherwise healthy, presented w/ pre-syncope, ataxia, L arm weakness. The patient c/o dizziness and intermittent pre-syncope for 2 weeks. She also c/o worsening ataxia and L arm weakness. Her PCP has set up for an outpatient head/neck CT and chest CT for tomorrow, but her daughter brought her into the ED tonight. Denied chest pain, SOB, fevers, chills, nausea, vomiting, diarrhea. Allergies   Allergen Reactions    Penicillins Anaphylaxis       Prior to Admission medications    Medication Sig Start Date End Date Taking? Authorizing Provider   LUMIGAN 0.01 % ophthalmic drops INSTILL 1 DROP INTO BOTH EYES EVERY DAY AT NIGHT 19   Provider, Historical   aspirin 81 mg chewable tablet Take 81 mg by mouth every seven (7) days.     Provider, Historical       Past Medical History:   Diagnosis Date    Glaucoma     History of mammography, screening 2019    Normal    Pap smear for cervical cancer screening 2019    Negative        Past Surgical History:   Procedure Laterality Date    HX ORTHOPAEDIC      lower back surgery    HX ORTHOPAEDIC      cervical neck surgery       Social History     Tobacco Use    Smoking status: Never Smoker    Smokeless tobacco: Never Used   Substance Use Topics    Alcohol use: Never        Family History   Problem Relation Age of Onset    Heart Disease Mother     Heart Disease Father         Review of Systems:  (bold if positive, if negative)    Gen:  Eyes:  ENT:  CVS:   dizziness, syncope,Pulm:  GI:  :  MS:  Skin:  Psych:  Endo:  Hem:  Renal:  Neuro:   weakness, Objective:      VITALS:    Vital signs reviewed; most recent are:    Visit Vitals  /63 (BP 1 Location: Right upper arm, BP Patient Position: At rest;Sitting)   Pulse 72   Temp 97.5 °F (36.4 °C)   Resp 17   Ht 5' 2\" (1.575 m)   Wt 74.8 kg (165 lb)   SpO2 97%   BMI 30.18 kg/m²     SpO2 Readings from Last 6 Encounters:   12/13/21 97%        No intake or output data in the 24 hours ending 12/14/21 0035     Exam:     Physical Exam:    Gen:  Frail, ill-appearing, NAD  HEENT:  Pink conjunctivae, PERRL, hearing intact to voice, dry mucous membranes  Neck:  Supple, without masses, thyroid non-tender  Resp:  No accessory muscle use, clear breath sounds without wheezes rales or rhonchi  Card:  No murmurs, normal S1, S2 without thrills, bruits or peripheral edema  Abd:  Soft, non-tender, non-distended, normoactive bowel sounds are present  Lymph:  No cervical adenopathy  Musc:  No cyanosis or clubbing  Skin:  No rashes   Neuro:  Cranial nerves 3-12 are grossly intact, slight L arm weakness, follows commands appropriately  Psych:  Alert with good insight. Oriented to person, place, and time    Labs:    Recent Labs     12/13/21  2207   WBC 12.2*   HGB 13.7   HCT 42.0   *     Recent Labs     12/13/21  2207      K 4.5   CL 99   CO2 29   *   BUN 7   CREA 0.91   CA 10.2*   ALB 3.9   TBILI 0.5   ALT 13     No results found for: GLUCPOC  No results for input(s): PH, PCO2, PO2, HCO3, FIO2 in the last 72 hours. No results for input(s): INR, INREXT in the last 72 hours. Radiology and EKG reviewed:   pending    **Old Records reviewed in [address] [facility]**       Assessment/Plan:       Principal Problem:    69 yo otherwise healthy, presented w/ pre-syncope, ataxia, L arm weakness    1) Pre-Syncope and collapse/dizziness/ataxia/L arm weakness: unclear etiology. Concern for CVA. U/A neg for UTI. Awaiting head/chest CT. Will also add head MRI, echo, carotid dopplers, TSH, lipids. Start ASA.   Consult Cards, Neuro, PT/OT    Risk of deterioration: medium      Total time spent with patient: 48 Minutes **I personally saw and examined the patient during this time period**                 Care Plan discussed with: Patient, nursing, daughter     Discussed:  Care Plan    Prophylaxis:  Lovenox    Probable Disposition:  Home w/Family           ___________________________________________________    Attending Physician: Albert Srivastava MD

## 2021-12-15 ENCOUNTER — APPOINTMENT (OUTPATIENT)
Dept: GENERAL RADIOLOGY | Age: 76
DRG: 065 | End: 2021-12-15
Attending: INTERNAL MEDICINE
Payer: MEDICARE

## 2021-12-15 VITALS
WEIGHT: 165 LBS | OXYGEN SATURATION: 98 % | BODY MASS INDEX: 30.36 KG/M2 | TEMPERATURE: 97.6 F | HEIGHT: 62 IN | HEART RATE: 83 BPM | SYSTOLIC BLOOD PRESSURE: 123 MMHG | RESPIRATION RATE: 18 BRPM | DIASTOLIC BLOOD PRESSURE: 62 MMHG

## 2021-12-15 LAB
ALBUMIN SERPL-MCNC: 3.1 G/DL (ref 3.5–5)
ALBUMIN/GLOB SERPL: 0.8 {RATIO} (ref 1.1–2.2)
ALP SERPL-CCNC: 66 U/L (ref 45–117)
ALT SERPL-CCNC: 11 U/L (ref 12–78)
ANION GAP SERPL CALC-SCNC: 6 MMOL/L (ref 5–15)
AST SERPL-CCNC: 12 U/L (ref 15–37)
ATRIAL RATE: 79 BPM
BILIRUB DIRECT SERPL-MCNC: 0.1 MG/DL (ref 0–0.2)
BILIRUB SERPL-MCNC: 0.3 MG/DL (ref 0.2–1)
BUN SERPL-MCNC: 11 MG/DL (ref 6–20)
BUN/CREAT SERPL: 16 (ref 12–20)
CALCIUM SERPL-MCNC: 9.3 MG/DL (ref 8.5–10.1)
CALCULATED P AXIS, ECG09: -2 DEGREES
CALCULATED R AXIS, ECG10: 19 DEGREES
CALCULATED T AXIS, ECG11: 42 DEGREES
CHLORIDE SERPL-SCNC: 106 MMOL/L (ref 97–108)
CO2 SERPL-SCNC: 27 MMOL/L (ref 21–32)
CREAT SERPL-MCNC: 0.7 MG/DL (ref 0.55–1.02)
D DIMER PPP FEU-MCNC: 0.52 MG/L FEU (ref 0–0.65)
DIAGNOSIS, 93000: NORMAL
ERYTHROCYTE [DISTWIDTH] IN BLOOD BY AUTOMATED COUNT: 15.3 % (ref 11.5–14.5)
GLOBULIN SER CALC-MCNC: 3.8 G/DL (ref 2–4)
GLUCOSE SERPL-MCNC: 99 MG/DL (ref 65–100)
HCT VFR BLD AUTO: 39 % (ref 35–47)
HGB BLD-MCNC: 12.8 G/DL (ref 11.5–16)
LEFT CCA DIST DIAS: 19.3 CM/S
LEFT CCA DIST SYS: 72.3 CM/S
LEFT CCA PROX DIAS: 16.7 CM/S
LEFT CCA PROX SYS: 82.7 CM/S
LEFT ECA DIAS: 0 CM/S
LEFT ECA SYS: 127.6 CM/S
LEFT ICA DIST DIAS: 32.2 CM/S
LEFT ICA DIST SYS: 73.6 CM/S
LEFT ICA MID DIAS: 21.9 CM/S
LEFT ICA MID SYS: 74.9 CM/S
LEFT ICA PROX DIAS: 23.2 CM/S
LEFT ICA PROX SYS: 65.9 CM/S
LEFT ICA/CCA SYS: 1.04
LEFT VERTEBRAL DIAS: 0 CM/S
LEFT VERTEBRAL SYS: 41.8 CM/S
MAGNESIUM SERPL-MCNC: 2.5 MG/DL (ref 1.6–2.4)
MCH RBC QN AUTO: 31.1 PG (ref 26–34)
MCHC RBC AUTO-ENTMCNC: 32.8 G/DL (ref 30–36.5)
MCV RBC AUTO: 94.7 FL (ref 80–99)
NRBC # BLD: 0 K/UL (ref 0–0.01)
NRBC BLD-RTO: 0 PER 100 WBC
P-R INTERVAL, ECG05: 136 MS
PHOSPHATE SERPL-MCNC: 2.7 MG/DL (ref 2.6–4.7)
PLATELET # BLD AUTO: 418 K/UL (ref 150–400)
PMV BLD AUTO: 9.9 FL (ref 8.9–12.9)
POTASSIUM SERPL-SCNC: 4.6 MMOL/L (ref 3.5–5.1)
PROT SERPL-MCNC: 6.9 G/DL (ref 6.4–8.2)
Q-T INTERVAL, ECG07: 382 MS
QRS DURATION, ECG06: 68 MS
QTC CALCULATION (BEZET), ECG08: 438 MS
RBC # BLD AUTO: 4.12 M/UL (ref 3.8–5.2)
RIGHT CCA DIST DIAS: 16.4 CM/S
RIGHT CCA DIST SYS: 77.9 CM/S
RIGHT CCA PROX DIAS: 8.7 CM/S
RIGHT CCA PROX SYS: 75.7 CM/S
RIGHT ECA DIAS: 0 CM/S
RIGHT ECA SYS: 148.5 CM/S
RIGHT ICA DIST DIAS: 27.4 CM/S
RIGHT ICA DIST SYS: 85.6 CM/S
RIGHT ICA MID DIAS: 30.7 CM/S
RIGHT ICA MID SYS: 73.5 CM/S
RIGHT ICA PROX DIAS: 19.7 CM/S
RIGHT ICA PROX SYS: 58.2 CM/S
RIGHT ICA/CCA SYS: 1.1
RIGHT VERTEBRAL DIAS: 8.68 CM/S
RIGHT VERTEBRAL SYS: 42.8 CM/S
SODIUM SERPL-SCNC: 139 MMOL/L (ref 136–145)
T4 FREE SERPL-MCNC: 0.8 NG/DL (ref 0.8–1.5)
VAS LEFT SUBCLAVIAN PROX EDV: 0 CM/S
VAS LEFT SUBCLAVIAN PROX PSV: 132.2 CM/S
VAS RIGHT SUBCLAVIAN PROX EDV: 6.3 CM/S
VAS RIGHT SUBCLAVIAN PROX PSV: 129.1 CM/S
VENTRICULAR RATE, ECG03: 79 BPM
WBC # BLD AUTO: 15.2 K/UL (ref 3.6–11)

## 2021-12-15 PROCEDURE — 97535 SELF CARE MNGMENT TRAINING: CPT

## 2021-12-15 PROCEDURE — 65270000029 HC RM PRIVATE

## 2021-12-15 PROCEDURE — 96372 THER/PROPH/DIAG INJ SC/IM: CPT

## 2021-12-15 PROCEDURE — G0378 HOSPITAL OBSERVATION PER HR: HCPCS

## 2021-12-15 PROCEDURE — 96376 TX/PRO/DX INJ SAME DRUG ADON: CPT

## 2021-12-15 PROCEDURE — 97116 GAIT TRAINING THERAPY: CPT

## 2021-12-15 PROCEDURE — 85379 FIBRIN DEGRADATION QUANT: CPT

## 2021-12-15 PROCEDURE — 83735 ASSAY OF MAGNESIUM: CPT

## 2021-12-15 PROCEDURE — 99232 SBSQ HOSP IP/OBS MODERATE 35: CPT | Performed by: PSYCHIATRY & NEUROLOGY

## 2021-12-15 PROCEDURE — 80048 BASIC METABOLIC PNL TOTAL CA: CPT

## 2021-12-15 PROCEDURE — 97530 THERAPEUTIC ACTIVITIES: CPT

## 2021-12-15 PROCEDURE — 84100 ASSAY OF PHOSPHORUS: CPT

## 2021-12-15 PROCEDURE — 80076 HEPATIC FUNCTION PANEL: CPT

## 2021-12-15 PROCEDURE — 71045 X-RAY EXAM CHEST 1 VIEW: CPT

## 2021-12-15 PROCEDURE — 85027 COMPLETE CBC AUTOMATED: CPT

## 2021-12-15 PROCEDURE — 74011250637 HC RX REV CODE- 250/637: Performed by: INTERNAL MEDICINE

## 2021-12-15 PROCEDURE — 84439 ASSAY OF FREE THYROXINE: CPT

## 2021-12-15 PROCEDURE — 36415 COLL VENOUS BLD VENIPUNCTURE: CPT

## 2021-12-15 PROCEDURE — 74011250637 HC RX REV CODE- 250/637: Performed by: PSYCHIATRY & NEUROLOGY

## 2021-12-15 PROCEDURE — 74011250636 HC RX REV CODE- 250/636: Performed by: INTERNAL MEDICINE

## 2021-12-15 RX ORDER — CLOPIDOGREL BISULFATE 75 MG/1
75 TABLET ORAL DAILY
Status: DISCONTINUED | OUTPATIENT
Start: 2021-12-15 | End: 2021-12-15 | Stop reason: HOSPADM

## 2021-12-15 RX ORDER — ATORVASTATIN CALCIUM 20 MG/1
20 TABLET, FILM COATED ORAL DAILY
Qty: 90 TABLET | Refills: 0 | Status: SHIPPED | OUTPATIENT
Start: 2021-12-16 | End: 2022-03-21 | Stop reason: SDUPTHER

## 2021-12-15 RX ORDER — GUAIFENESIN 100 MG/5ML
81 LIQUID (ML) ORAL DAILY
Status: DISCONTINUED | OUTPATIENT
Start: 2021-12-16 | End: 2021-12-15 | Stop reason: HOSPADM

## 2021-12-15 RX ORDER — CLOPIDOGREL BISULFATE 75 MG/1
75 TABLET ORAL DAILY
Qty: 90 TABLET | Refills: 0 | Status: SHIPPED | OUTPATIENT
Start: 2021-12-16 | End: 2022-03-02

## 2021-12-15 RX ORDER — ATORVASTATIN CALCIUM 20 MG/1
20 TABLET, FILM COATED ORAL DAILY
Status: DISCONTINUED | OUTPATIENT
Start: 2021-12-15 | End: 2021-12-15 | Stop reason: HOSPADM

## 2021-12-15 RX ADMIN — ENOXAPARIN SODIUM 40 MG: 100 INJECTION SUBCUTANEOUS at 08:31

## 2021-12-15 RX ADMIN — ATORVASTATIN CALCIUM 20 MG: 20 TABLET, FILM COATED ORAL at 09:38

## 2021-12-15 RX ADMIN — FAMOTIDINE 20 MG: 10 INJECTION INTRAVENOUS at 08:32

## 2021-12-15 RX ADMIN — CLOPIDOGREL BISULFATE 75 MG: 75 TABLET ORAL at 09:39

## 2021-12-15 RX ADMIN — ASPIRIN 325 MG: 325 TABLET, FILM COATED ORAL at 08:32

## 2021-12-15 NOTE — PROGRESS NOTES
Trinidad Kaiser MD, [address] [place] [address]  Pavel Coello [facility] 33  [phone]      IMPRESSION and RECOMMENDATIONS     1. Lightheadedness:  No cardiac etiology. Echo (12/10/21) at [facility] showed normal LVF (EF 55%) and mild to mod LVH without valve abnormalities. Bubble study here without evidence of intracardiac shunt. Tele with one short episode of SVT, but no evidence of AF. I suspect lightheadedness is due to cerebellar CVA and vertebrobasilar insufficiency. Agree with DAPT and statin.     2. LUE numbness:  She has had C-spine surgery, and I wonder if her symptoms represent radiculopathy.     No other recs at this time. Will see prn. I gave her my contact info should issues arise. I have discussed this plan with the patient. She appears to understand this plan and wishes to proceed ahead. Subjective:       No complaints. Objective:   Patient Vitals for the past 16 hrs:   BP Temp Pulse Resp SpO2   12/15/21 0934 (!) 106/49 97.6 °F (36.4 °C) 63 18 98 %   12/15/21 0700   67     12/15/21 0339 128/65 98.6 °F (37 °C) 67 18 93 %   12/14/21 2317 136/67 97.6 °F (36.4 °C) 70 18 94 %   12/14/21 2306   74     12/14/21 1908 116/68 98.5 °F (36.9 °C) 88 18 93 %       HEENT Exam:     WNL         Lung Exam:     The patient is not dyspneic. Breath sounds are heard equally in all lung fields. There are no wheezes, rales, rhonchi, or rubs heard on auscultation. Heart Exam:     The rhythm is regular. The PMI is in the 5th intercostal space of the MCL. Apical impulse is normal. S1 is regular. S2 is physiologic. There is no S3, S4 gallop, murmur, click, or rub. Abdomen Exam:     Benign. Extremities Exam:     No cyanosis, clubbing, edema. Distal pulses intact.            Lab Results   Component Value Date/Time    Glucose 99 12/15/2021 03:25 AM    Sodium 139 12/15/2021 03:25 AM    Potassium 4.6 12/15/2021 03:25 AM    Chloride 106 12/15/2021 03:25 AM    CO2 27 12/15/2021 03:25 AM    BUN 11 12/15/2021 03:25 AM    Creatinine 0.70 12/15/2021 03:25 AM    Calcium 9.3 12/15/2021 03:25 AM     Recent Labs     12/15/21  0325 12/13/21 2207   WBC 15.2* 12.2*   HGB 12.8 13.7   HCT 39.0 42.0   * 475*     Recent Labs     12/15/21  0325 12/13/21 2207   ALT 11* 13   AP 66 86   TBILI 0.3 0.5   TP 6.9 8.0   ALB 3.1* 3.9   GLOB 3.8 4.1*     No results for input(s): INR, PTP, APTT, INREXT in the last 72 hours. No results for input(s): CPK, CKMB, TNIPOC in the last 72 hours. No lab exists for component: TROPONINI, ITNL  No results for input(s): TROIQ in the last 72 hours.   Lab Results   Component Value Date/Time    Cholesterol, total 164 12/13/2021 10:07 PM    HDL Cholesterol 49 12/13/2021 10:07 PM    LDL, calculated 92.2 12/13/2021 10:07 PM    Triglyceride 114 12/13/2021 10:07 PM    CHOL/HDL Ratio 3.3 12/13/2021 10:07 PM

## 2021-12-15 NOTE — PROGRESS NOTES
Neurology Progress Note    Patient ID:  Mary Anne Bucio  [id number]  70 y.o.  1945    CC: dizziness and wobbliness    Subjective:      Patient still complaining of feelings of lightheadedness and still some unsteadiness. Speech is still slightly slurred. Carotid Doppler study did not reveal any significant stenosis. Brain MRI without contrast revealed small acute infarcts bilateral cerebellar hemisphere and small acute infarct right occipital lobe. Labs done revealed increased WBC 15.2, increased platelet at 340, increased magnesium at 2.5 and unremarkable CMP. Current Facility-Administered Medications   Medication Dose Route Frequency    0.9% sodium chloride infusion  75 mL/hr IntraVENous CONTINUOUS    prochlorperazine (COMPAZINE) injection 10 mg  10 mg IntraVENous Q6H PRN    HYDROmorphone (DILAUDID) syringe 0.5 mg  0.5 mg IntraVENous Q4H PRN    sodium chloride (NS) flush 5-40 mL  5-40 mL IntraVENous Q8H    sodium chloride (NS) flush 5-40 mL  5-40 mL IntraVENous PRN    0.9% sodium chloride infusion 25 mL  25 mL IntraVENous PRN    acetaminophen (TYLENOL) tablet 650 mg  650 mg Oral Q6H PRN    Or    acetaminophen (TYLENOL) suppository 650 mg  650 mg Rectal Q6H PRN    bisacodyL (DULCOLAX) suppository 10 mg  10 mg Rectal DAILY PRN    promethazine (PHENERGAN) tablet 12.5 mg  12.5 mg Oral Q6H PRN    Or    ondansetron (ZOFRAN) injection 4 mg  4 mg IntraVENous Q6H PRN    enoxaparin (LOVENOX) injection 40 mg  40 mg SubCUTAneous DAILY    albuterol-ipratropium (DUO-NEB) 2.5 MG-0.5 MG/3 ML  3 mL Nebulization Q4H PRN    aspirin tablet 325 mg  325 mg Oral DAILY        Review of Systems:    Pertinent items are noted in HPI.     Objective:     Patient Vitals for the past 8 hrs:   BP Temp Pulse Resp SpO2   12/15/21 0700   67     12/15/21 0339 128/65 98.6 °F (37 °C) 67 18 93 %       12/15 0701 - 12/15 1900  In: 2191.3 [I.V.:2191.3]  Out: -   12/13 1901 - 12/15 0700  In: 2874 [P.O.:1080; I.V.:300]  Out: - Lab Review   Recent Results (from the past 24 hour(s))   DUPLEX CAROTID BILATERAL    Collection Time: 12/14/21 10:12 AM   Result Value Ref Range    Right subclavian prox .1 cm/s    Right subclavian prox EDV 6.3 cm/s    Right cca dist sys 77.9 cm/s    Right CCA dist whitney 16.4 cm/s    Right CCA prox sys 75.7 cm/s    Right CCA prox whitney 8.7 cm/s    Right eca sys 148.5 cm/s    RIGHT EXTERNAL CAROTID ARTERY D 0.00 cm/s    Right ICA dist sys 85.6 cm/s    Right ICA dist whitney 27.4 cm/s    Right ICA mid sys 73.5 cm/s    Right ICA mid whitney 30.7 cm/s    Right ICA prox sys 58.2 cm/s    Right ICA prox whitney 19.7 cm/s    Right vertebral sys 42.8 cm/s    RIGHT VERTEBRAL ARTERY D 8.68 cm/s    Right ICA/CCA sys 1.1     Left subclavian prox .2 cm/s    Left subclavian prox EDV 0.0 cm/s    Left CCA dist sys 72.3 cm/s    Left CCA dist whitney 19.3 cm/s    Left CCA prox sys 82.7 cm/s    Left CCA prox whitney 16.7 cm/s    Left ECA sys 127.6 cm/s    LEFT EXTERNAL CAROTID ARTERY D 0.00 cm/s    Left ICA dist sys 73.6 cm/s    Left ICA dist whitney 32.2 cm/s    Left ICA mid sys 74.9 cm/s    Left ICA mid whitney 21.9 cm/s    Left ICA prox sys 65.9 cm/s    Left ICA prox whitney 23.2 cm/s    Left vertebral sys 41.8 cm/s    LEFT VERTEBRAL ARTERY D 0.00 cm/s    Left ICA/CCA sys 1.04    CBC W/O DIFF    Collection Time: 12/15/21  3:25 AM   Result Value Ref Range    WBC 15.2 (H) 3.6 - 11.0 K/uL    RBC 4.12 3.80 - 5.20 M/uL    HGB 12.8 11.5 - 16.0 g/dL    HCT 39.0 35.0 - 47.0 %    MCV 94.7 80.0 - 99.0 FL    MCH 31.1 26.0 - 34.0 PG    MCHC 32.8 30.0 - 36.5 g/dL    RDW 15.3 (H) 11.5 - 14.5 %    PLATELET 557 (H) 374 - 400 K/uL    MPV 9.9 8.9 - 12.9 FL    NRBC 0.0 0  WBC    ABSOLUTE NRBC 0.00 0.00 - 9.45 K/uL   METABOLIC PANEL, BASIC    Collection Time: 12/15/21  3:25 AM   Result Value Ref Range    Sodium 139 136 - 145 mmol/L    Potassium 4.6 3.5 - 5.1 mmol/L    Chloride 106 97 - 108 mmol/L    CO2 27 21 - 32 mmol/L    Anion gap 6 5 - 15 mmol/L Glucose 99 65 - 100 mg/dL    BUN 11 6 - 20 MG/DL    Creatinine 0.70 0.55 - 1.02 MG/DL    BUN/Creatinine ratio 16 12 - 20      GFR est AA >60 >60 ml/min/1.73m2    GFR est non-AA >60 >60 ml/min/1.73m2    Calcium 9.3 8.5 - 10.1 MG/DL   MAGNESIUM    Collection Time: 12/15/21  3:25 AM   Result Value Ref Range    Magnesium 2.5 (H) 1.6 - 2.4 mg/dL   PHOSPHORUS    Collection Time: 12/15/21  3:25 AM   Result Value Ref Range    Phosphorus 2.7 2.6 - 4.7 MG/DL   HEPATIC FUNCTION PANEL    Collection Time: 12/15/21  3:25 AM   Result Value Ref Range    Protein, total 6.9 6.4 - 8.2 g/dL    Albumin 3.1 (L) 3.5 - 5.0 g/dL    Globulin 3.8 2.0 - 4.0 g/dL    A-G Ratio 0.8 (L) 1.1 - 2.2      Bilirubin, total 0.3 0.2 - 1.0 MG/DL    Bilirubin, direct 0.1 0.0 - 0.2 MG/DL    Alk. phosphatase 66 45 - 117 U/L    AST (SGOT) 12 (L) 15 - 37 U/L    ALT (SGPT) 11 (L) 12 - 78 U/L     PHYSICAL EXAM:     NEUROLOGICAL EXAM:     Appearance: The patient is obese, provides a coherent history and is in no acute distress. Mental Status: Oriented to time, place and person. Fluent, no aphasia. Mild  dysarthria. Mood and affect appropriate. Cranial Nerves:   Intact visual fields. SANA, EOM's full, no nystagmus, no ptosis. Facial sensation is normal. Corneal reflexes are intact. Facial movement is symmetric. Decrease hearing bilaterally. Palate is midline with normal elevation. Sternocleidomastoid and trapezius muscles are normal. Tongue is midline. Motor:  5/5 strength in upper and lower proximal and distal muscles. Normal bulk and tone. No pronator drift. Reflexes:   Deep tendon reflexes 1+/4 and symmetrical. Downgoing toes. Sensory:   Normal to cold and vibration. Gait:  Not tested. Tremor:   No tremor noted. Cerebellar:  Intact FTN/ADRIANA/HTS.          Assessment:   Acute CVA  Syncope and collapse  Cerebral atherosclerotic disease  Hyperlipidemia    Plan:   Neurological examination reveals just mild slurred speech and gait instability per physical therapy. Status post right occipital and bilateral cerebellar infarcts.     Head CT without contrast did not reveal any acute process. Periventricular white matter disease. Brain MRI without contrast revealed small acute infarct right greater than left cerebellar hemisphere and small acute infarct right occipital lobe.     Head and neck CTA with contrast revealed bilateral vertebral arteries are diminutive in caliber with severe atherosclerotic changes.  Long segment occlusion in the right cervical vertebral artery.  Underlying dissection is not excluded.  Intracranial V4 segments are patent at the level of PICA.  Moderate to severe atherosclerotic changes in the cavernous internal carotid artery.  Moderate atherosclerosis in the left posterior cerebral artery. Appreciate neuro interventional input. No intervention necessary. Recommended antiplatelet therapy. Carotid Doppler studies did not reveal any significant ICA stenosis. Echocardiogram did not reveal any PFO.       LDL was 92.2. Given stroke which should be less than 70. Patient started on atorvastatin 20 mg daily. Given significant atherosclerotic changes plus strokes, aspirin was changed to 81 mg daily and Plavix 75 mg daily was added. Will check in the outpatient for aspirin and Plavix efficacy and after 90 days determine whether patient can be on monotherapy.     Patient was advised per DMV regulation she cannot drive for 6 months. She understood. Patient will benefit from physical therapy. Patient is okay to be discharged from a neurological standpoint. Follow-up in my clinic in 1 month. This note was created using voice recognition software.  Despite editing, there may be syntax errors.            Signed:  Anali Mayfield MD  12/15/2021  9:10 AM

## 2021-12-15 NOTE — PROGRESS NOTES
Problem: Self Care Deficits Care Plan (Adult)  Goal: *Acute Goals and Plan of Care (Insert Text)  Description: FUNCTIONAL STATUS PRIOR TO ADMISSION: Patient was independent and active without use of DME. Patient was independent for basic and instrumental ADLs. HOME SUPPORT: The patient lived alone with family locally to provide assistance. Pt drives her grandchildren to school everyday. Occupational Therapy Goals  Initiated 12/14/2021  1. Patient will perform grooming, standing at sink for >2 minutes, with modified independence within 7 day(s). 2.  Patient will perform bathing, sitting and standing PRN, with modified independence within 7 day(s). 3.  Patient will perform all aspects of toileting with modified independence within 7 day(s). 4.  Patient will participate in upper extremity therapeutic exercise/activities with modified independence for 10 minutes within 7 day(s). 5.  Patient will utilize energy conservation techniques during functional activities with verbal cues within 7 day(s). Outcome: Progressing Towards Goal     OCCUPATIONAL THERAPY TREATMENT  Patient: Mary Anne Bucio (20 y.o. female)  Date: 12/15/2021  Diagnosis: Syncope and collapse [R55] Syncope and collapse       Precautions:    Chart, occupational therapy assessment, plan of care, and goals were reviewed. ASSESSMENT  Patient continues with skilled OT services and is progressing towards goals. Noted results of MRI revealed infarcts of bilateral cerebellar hemispheres and R occipital lobe. Pt today received in bed with observed improved speech (no longer with tongue/lip swelling) and is alert and oriented. Pt able to perform serial ADL tasks, both seated and in bathroom with overall supervision/SBA and simulated IADL tasks, including reaching up into cabinet and retrieving items from floor. No LOB noted during OOB activity. Vision still [facility] but pt does report plans for surgery in January for cataracts.  Supportive dtr present for duration of session. Current Level of Function Impacting Discharge (ADLs): SBA to supervision for ADLs    Other factors to consider for discharge: lives alone; active and independent PLOF         PLAN :  Patient continues to benefit from skilled intervention to address the above impairments. Continue treatment per established plan of care to address goals. Recommend with staff: mobility to bathroom for toileting with SBA; ADLs as needed with SBA/supervision; OOB to chair for all meals    Recommend next OT session: simulated IADLs    Recommendation for discharge: (in order for the patient to meet his/her long term goals)  No skilled occupational therapy/ follow up rehabilitation needs identified at this time. This discharge recommendation:  Has been made in collaboration with the attending provider and/or case management    IF patient discharges home will need the following DME: none       SUBJECTIVE:   Patient stated I feel just a little wobbly, but I've also been in the bed for a while, which is not normal for me.     OBJECTIVE DATA SUMMARY:   Cognitive/Behavioral Status:  Neurologic State: Alert  Orientation Level: Oriented X4  Cognition: Appropriate decision making; Appropriate for age attention/concentration; Appropriate safety awareness; Follows commands  Perception: Appears intact  Perseveration: No perseveration noted  Safety/Judgement: Awareness of environment; Fall prevention;Good awareness of safety precautions; Home safety; Insight into deficits    Functional Mobility and Transfers for ADLs:  Bed Mobility:  Supine to Sit: Independent  Sit to Supine: Independent    Transfers:  Sit to Stand: Supervision  Functional Transfers  Toilet Transfer : Supervision  Bed to Chair: Supervision;Stand-by assistance    Balance:  Sitting: Intact  Standing: Intact; Without support    ADL Intervention:  Grooming  Grooming Assistance: Supervision;Modified independent  Position Performed: Standing (at sink)  Washing Hands: Supervision;Modified independent  Cues: Verbal cues provided    Lower Body Dressing Assistance  Socks: Set-up  Leg Crossed Method Used: Yes  Position Performed: Seated edge of bed  Cues: Don    Toileting  Bladder Hygiene: Modified independent  Clothing Management: Supervision    Cognitive Retraining  Safety/Judgement: Awareness of environment; Fall prevention;Good awareness of safety precautions; Home safety; Insight into deficits    Pain:  Pt denied pain this session    Activity Tolerance:   Good    After treatment patient left in no apparent distress:   seated eating lunch; dtr at bedside; call bell in reach    COMMUNICATION/COLLABORATION:   The patients plan of care was discussed with: Physical therapist, Physical therapy assistant and Registered nurse. Patient was educated regarding her deficit(s) of impaired balance as this relates to her diagnosis of cerebellar CVA. She demonstrated Good understanding as evidenced by acknowledgement of deficit. Patient and/or family was verbally educated on the BE FAST acronym for signs/symptoms of CVA and TIA. Informed patient to refer to the Stroke Binder for further BE FAST information. All questions answered with patient indicating good understanding.      Eloisa Bamberger, OT  Time Calculation: 24 mins

## 2021-12-15 NOTE — PROGRESS NOTES
26  Pt with discharge orders. Discharge medications reviewed with patient and appropriate educational materials and side effects teaching were provided. I have reviewed discharge instructions with the patient. The patient verbalized understanding. AVS signed and given to patient. Peripheral IV removed x 2. Information for [facility] reviewed.

## 2021-12-15 NOTE — PROGRESS NOTES
12/15/2021  Case Management Progress Note    10:40 AM  Patient is 70year old female admitted 12/13 for syncope nad collapse  Patient does not have an RUR score due to observation status, letter given yesterday  Covid test: negative 12/14   Chart reviewed--patient discussed at IDR rounds  Per rounds patient may be ready for discharge today, she would benefit from home health PT/OT/RN--orders in, appreciate help of fellow CM [facility] in arranging this. Will continue to follow and update. Transition of Care Plan   1. Continue medical management  2. Maybe home today with [facility]  3. Family will transport at discharge   4. Follow up with PCP, specialties as needed  5.  CM will follow    TORI Agosto

## 2021-12-15 NOTE — PROGRESS NOTES
Problem: Mobility Impaired (Adult and Pediatric)  Goal: *Acute Goals and Plan of Care (Insert Text)  Description: FUNCTIONAL STATUS PRIOR TO ADMISSION: Patient was independent and active without use of DME.    HOME SUPPORT PRIOR TO ADMISSION: The patient lived alone with no local support. Daughter present at evaluation. Patient drives her daughter's children to school everyday    Physical Therapy Goals  Initiated 12/14/2021  1. Patient will move from supine to sit and sit to supine  in bed with independence within 7 day(s). 2.  Patient will transfer from bed to chair and chair to bed with independence using the least restrictive device within 7 day(s). 3.  Patient will perform sit to stand with independence within 7 day(s). 4.  Patient will ambulate with independence for 150 feet with the least restrictive device within 7 day(s). 5.  Patient will ascend/descend 10 stairs with single handrail(s) with minimal assistance/contact guard assist within 7 day(s). Note:   PHYSICAL THERAPY TREATMENT  Patient: Luna Chandler (31 y.o. female)  Date: 12/15/2021  Diagnosis: Syncope and collapse [R55] Syncope and collapse       Precautions:    Chart, physical therapy assessment, plan of care and goals were reviewed. ASSESSMENT  Patient continues with skilled PT services. Pt supine to sit to stand with SBA. Pt ambulated 140ft with no device SBA. Pts balance is good on level surface. Pt left sitting. Pt progressing well. Continue goals. PLAN :  Patient continues to benefit from skilled intervention to address the above impairments. Continue treatment per established plan of care. to address goals. Recommendation for discharge: (in order for the patient to meet his/her long term goals)  No skilled physical therapy/ follow up rehabilitation needs identified at this time.     This discharge recommendation:  Has been made in collaboration with the attending provider and/or case management    IF patient discharges home will need the following DME: rolling walker       SUBJECTIVE:       OBJECTIVE DATA SUMMARY:   Critical Behavior:  Neurologic State: Alert  Orientation Level: Oriented X4  Cognition: Follows commands,Appropriate decision making  Safety/Judgement: Awareness of environment  Functional Mobility Training:  Bed Mobility:     Supine to Sit: Stand-by assistance              Transfers:  Sit to Stand: Stand-by assistance  Stand to Sit: Stand-by assistance                             Balance:  Sitting: Intact  Ambulation/Gait Training:  Distance (ft): 140 Feet (ft)  Assistive Device: Gait belt  Ambulation - Level of Assistance: Stand-by assistance        Gait Abnormalities: Decreased step clearance        Base of Support: Narrowed        Step Length: Right shortened;Left shortened                    Activity Tolerance:   Good    After treatment patient left in no apparent distress:   Sitting in chair    COMMUNICATION/COLLABORATION:   The patients plan of care was discussed with: Physical therapist.     Anam Daigle PTA   Time Calculation: 23 mins

## 2021-12-15 NOTE — PROGRESS NOTES
3:08 PM  Patient accepted by [facility]. Sent Dc summary. No further dc needs. Akbar Ornelas    12/15/21  2:13 PM  CM followed up with patient and she is interested in home health-PT- would like a referral placed with [facility]. Akbar Ornelas      12/15/21  11:52 AM    CM noted HH consult. CM spoke to patient and her daughter in her room and patient said she worked with PT this am and the therapist felt she did not need home health- CM will review note once placed. She said she walked independently and she feels she will be ok without home health.  Akbar Ornelas

## 2021-12-15 NOTE — PROGRESS NOTES
Bedside shift change report given to KIRA Caruso (oncoming nurse) by Ravinder Niño RN (offgoing nurse). Report included the following information SBAR, Kardex, Intake/Output, MAR and Recent Results.

## 2021-12-15 NOTE — DISCHARGE SUMMARY
[facility]  [address] [facility], [address] [place] [address]  [phone]    Physician Discharge Summary     Patient ID:  Luna Chandler  [id number]  89 y.o.  1945    Admit date: 12/13/2021    Discharge date and time: 12/15/2021 2:21 PM    Admission Diagnoses: Syncope and collapse [R55]    Discharge Diagnoses:  Principal Diagnosis Syncope and collapse                                            Principal Problem:    Syncope and collapse (12/14/2021)    Active Problems:    Left arm weakness (12/14/2021)         Hospital Course:     Acute Bilateral Cerebellar Hemispheres and Right Occipital Lobe/ Cerebral atherosclerotic disease/ Ataxia: Improved. Echo with no shunt. Interventional neurology consulted due to concern for right vertebral artery occlusion and left vertebral artery stenosis and concern for vertebral artery dissection; they recommended medical management with no rec for surgical intervention at this time. Neurology evaluated. Patient started on aspirin and Plavix and continued on discharge. LDL elevated; started on statin and continued on discharge. Home PT OT. Follow-up neurology and neuro-ophthalmology outpatient. Follow-up interventional neurology outpatient as needed. Patient instructed not to drive. Lightheadedness/ pre-symcope: Likely due to above. Improved. Blood pressure within normal limits; not orthostatic on check today. Evaluated by cardiology who did not feel this was related to cardiac etiology. Allergic reaction to contrast: Resolved. Patient experienced lip swelling/shortness of breath following contrast.  Status post IV steroids, Pepcid and Benadryl. Leukocytosis: Suspect stress reaction and due to steroids. UA unremarkable for infection and chest x-ray unremarkable for infiltrate. Follow-up PCP repeat blood work within 1 week.     Concern for pinched nerve/radiculopathy of LUE: Follow-up neurosurgery and orthopedics outpatient. Pulmonary nodules: Follow-up pulmonary outpatient. PCP: Kassie Alford DO     Consults: Neurology    Significant Diagnostic Studies:     CTA Head Neck W Cont     IMPRESSION     Bilateral vertebral arteries are diminutive in caliber with severe  atherosclerotic changes. Long segment occlusion in the right cervical vertebral  artery. There is no comparison exam. Underlying dissection is not excluded. Intracranial V4 segments are patent at the level of PICA. No intracranial large  vessel arterial occlusion.     Moderate to severe atherosclerotic changes in the cavernous internal carotid  arteries.     Moderate atherosclerosis in the left posterior cerebral artery.     A few biapical lung nodules measuring up to 4 mm with patchy groundglass  opacities     Guidelines by the Fleischner society (Radiology 2017 special report) suggest  that patients with low risk for lung cancer and solid nodule(s) less than or  equal to 6 mm in diameter require no follow-up. In patients with a higher risk,  such as smokers, follow-up noncontrast chest CT should be considered at 12  months. Patients with a known malignancy are at increased risk for metastasis  and should receive a three month follow-up. Carotid Duplex   Findings are consistent with 0-49% stenosis of the right internal carotid and 0-49% stenosis of the left internal carotid.       MRI Brain   IMPRESSION  Small acute infarctions bilateral cerebellar hemispheres and right occipital  lobe.       Discharge Exam:  Physical Exam:    Gen: Well-developed, well-nourished, in no acute distress  HEENT:  Pink conjunctivae, PERRL, very hard of hearing   Resp: No accessory muscle use, clear breath sounds without wheezes rales or rhonchi  Card: No murmurs, normal S1, S2 without thrills, bruits or peripheral edema  Abd:  Soft, non-tender, non-distended, normoactive bowel sounds are present  Musc: No cyanosis or clubbing  Skin: No rashes or ulcers, skin turgor is good  Neuro:  Cranial nerves are grossly intact, follows commands appropriately  Psych:  Good insight, oriented to person, place and time, alert    Disposition: home  Discharge Condition: Stable    Patient Instructions:   Current Discharge Medication List      START taking these medications    Details   clopidogreL (PLAVIX) 75 mg tab Take 1 Tablet by mouth daily. Qty: 90 Tablet, Refills: 0      atorvastatin (LIPITOR) 20 mg tablet Take 1 Tablet by mouth daily. Qty: 90 Tablet, Refills: 0         CONTINUE these medications which have NOT CHANGED    Details   LUMIGAN 0.01 % ophthalmic drops INSTILL 1 DROP INTO BOTH EYES EVERY DAY AT NIGHT  Refills: 12      aspirin 81 mg chewable tablet Take 81 mg by mouth every seven (7) days.            Activity: no driving   Diet: Low fat, Low cholesterol  Wound Care: None needed    Follow-up with  Follow-up Information     Follow up With Specialties Details Why [address] Urgent Care  Urgent Care in the home [address]  [address] [place] [address], [address] [facility],  Family Medicine Schedule an appointment as soon as possible for a visit in 1 week Hospital Follow Up, please obtain follow up CBC within 1 week  [address] [facility] [address]      Olga Catherine MD Neurology Schedule an appointment as soon as possible for a visit in 4 weeks Hospital Follow Up with neurology [address] [place] [address]  [phone]      Pino Jaeger MD Endovascular Surgical Neuroradiology Schedule an appointment as soon as possible for a visit As needed [address] [facility] [address] [place] [address]      Fahad Nayak MD Neurology Schedule an appointment as soon as possible for a visit in 1 week please follow up with neuro ophthamology [facility] [address] [facility]  [address] [facility]  [phone]         Please follow up with your neurosurgeon and orthopedics as soon as you can. No driving due to your stroke. [facility] [address] [place] [address] Pulmonary Clinic  Schedule an appointment as soon as possible for a visit in 2 weeks follow up pulmonary nodules  [address]  [address]  [phone]          Follow-up tests/labs as above.      Signed:  Erasmo Leslie DO  12/15/2021  2:21 PM  **I personally spent 35 min on discharge**

## 2021-12-21 ENCOUNTER — TELEPHONE (OUTPATIENT)
Dept: NEUROLOGY | Age: 76
End: 2021-12-21

## 2021-12-22 NOTE — TELEPHONE ENCOUNTER
Returned her call. She was scheduled too far out for her hospital follow up so we have scheduled for a 4 wks f/up to see Dr. Darwin Goldstein per discharge orders. Patient also had some other questions that could not be answered at this time. Advised she write them down and bring them to her visit in January so they could be addressed by the provider.

## 2022-01-11 ENCOUNTER — OFFICE VISIT (OUTPATIENT)
Dept: NEUROLOGY | Age: 77
End: 2022-01-11
Payer: MEDICARE

## 2022-01-11 VITALS — RESPIRATION RATE: 20 BRPM | DIASTOLIC BLOOD PRESSURE: 84 MMHG | SYSTOLIC BLOOD PRESSURE: 130 MMHG

## 2022-01-11 DIAGNOSIS — G47.12 IDIOPATHIC HYPERSOMNIA WITHOUT LONG SLEEP TIME: ICD-10-CM

## 2022-01-11 DIAGNOSIS — Z09 HOSPITAL DISCHARGE FOLLOW-UP: ICD-10-CM

## 2022-01-11 DIAGNOSIS — I67.2 ATHEROSCLEROTIC CEREBROVASCULAR DISEASE: ICD-10-CM

## 2022-01-11 DIAGNOSIS — E78.2 MIXED HYPERLIPIDEMIA: ICD-10-CM

## 2022-01-11 DIAGNOSIS — I63.9 ACUTE CVA (CEREBROVASCULAR ACCIDENT) (HCC): Primary | ICD-10-CM

## 2022-01-11 DIAGNOSIS — Z51.81 THERAPEUTIC DRUG MONITORING: ICD-10-CM

## 2022-01-11 PROCEDURE — 1111F DSCHRG MED/CURRENT MED MERGE: CPT | Performed by: PSYCHIATRY & NEUROLOGY

## 2022-01-11 PROCEDURE — G8432 DEP SCR NOT DOC, RNG: HCPCS | Performed by: PSYCHIATRY & NEUROLOGY

## 2022-01-11 PROCEDURE — G8400 PT W/DXA NO RESULTS DOC: HCPCS | Performed by: PSYCHIATRY & NEUROLOGY

## 2022-01-11 PROCEDURE — G8427 DOCREV CUR MEDS BY ELIG CLIN: HCPCS | Performed by: PSYCHIATRY & NEUROLOGY

## 2022-01-11 PROCEDURE — 99215 OFFICE O/P EST HI 40 MIN: CPT | Performed by: PSYCHIATRY & NEUROLOGY

## 2022-01-11 PROCEDURE — G8536 NO DOC ELDER MAL SCRN: HCPCS | Performed by: PSYCHIATRY & NEUROLOGY

## 2022-01-11 PROCEDURE — 1101F PT FALLS ASSESS-DOCD LE1/YR: CPT | Performed by: PSYCHIATRY & NEUROLOGY

## 2022-01-11 PROCEDURE — 1090F PRES/ABSN URINE INCON ASSESS: CPT | Performed by: PSYCHIATRY & NEUROLOGY

## 2022-01-11 PROCEDURE — 3017F COLORECTAL CA SCREEN DOC REV: CPT | Performed by: PSYCHIATRY & NEUROLOGY

## 2022-01-11 PROCEDURE — G8417 CALC BMI ABV UP PARAM F/U: HCPCS | Performed by: PSYCHIATRY & NEUROLOGY

## 2022-01-11 NOTE — PROGRESS NOTES
NEUROLOGY CLINIC NOTE    Patient ID:  Daron Quintanilla  [id number]  24 y.o.  1945    Date of Consultation:  January 11, 2022    Reason for Consultation:  Stroke    Chief Complaint   Patient presents with   [facility] Follow Up       History of Present Illness:     Patient Active Problem List    Diagnosis Date Noted    Syncope and collapse 12/14/2021    Left arm weakness 12/14/2021     Past Medical History:   Diagnosis Date    Glaucoma     History of mammography, screening 04/23/2019    Normal    Pap smear for cervical cancer screening 04/23/2019    Negative      Past Surgical History:   Procedure Laterality Date    HX ORTHOPAEDIC  1989    lower back surgery    HX ORTHOPAEDIC  2003    cervical neck surgery      Prior to Admission medications    Medication Sig Start Date End Date Taking? Authorizing Provider   clopidogreL (PLAVIX) 75 mg tab Take 1 Tablet by mouth daily. 12/16/21   Claudene Hole, DO   atorvastatin (LIPITOR) 20 mg tablet Take 1 Tablet by mouth daily. 12/16/21   Claudene Hole, DO   LUMIGAN 0.01 % ophthalmic drops INSTILL 1 DROP INTO BOTH EYES EVERY DAY AT NIGHT 2/27/19   Provider, Historical   aspirin 81 mg chewable tablet Take 81 mg by mouth every seven (7) days. Provider, Historical     Allergies   Allergen Reactions    Penicillins Anaphylaxis    Iodinated Contrast Media Shortness of Breath and Swelling      Social History     Tobacco Use    Smoking status: Never Smoker    Smokeless tobacco: Never Used   Substance Use Topics    Alcohol use: Never      Family History   Problem Relation Age of Onset    Heart Disease Mother     Heart Disease Father         Subjective:      Daron Quintanilla is a 78 y.o. WF with a history of glaucoma who is here for further evaluation after a recent stroke.      Patient was admitted at [address] [place] [address] last 12/14/2021 for intermittent episodes of dizziness, nausea and near syncopal spells for more than 1 week.   Patient complains of dizziness, nausea and near syncopal episodes since 12/4/2021. Patient has been seen in 4 different ERs the last week and wants answers. She was told likely secondary to vertigo and UTI. Taking medications for vertigo with no relief. Patient after receiving contrast dye at the CT scan developed lethargy was clammy, diaphoretic with slurred speech. Possible allergic reaction to contrast.  Solu-Medrol and Benadryl was administered. Patient also given epi intramuscularly. Patient is mentions her PCP having difficulty obtaining blood pressure on the left arm and difficulty palpating a pulse. Patient was sent to the ER for further evaluation.     In the ER blood pressure was 113/71. Labs done revealed unremarkable urinalysis, troponin, lipid panel (LDL 92.2), respiratory virus panel with COVID-19 PCR and hemoglobin A1c. CBC showing increased WBC at 12.2 and increased platelet at 852. CMP showing increased glucose at 110 and increased calcium at 10.2. Elevated TSH at 5. Head and neck CTA with contrast revealed bilateral vertebral arteries are diminutive in caliber with severe atherosclerotic changes. Long segment occlusion in the right cervical vertebral artery. Underlying dissection is not excluded. Intracranial V4 segments are patent at the level of PICA. Moderate to severe atherosclerotic changes in the cavernous internal carotid artery. Moderate atherosclerosis in the left posterior cerebral artery. A few biapical lung nodules measuring up to 4 mm with patchy groundglass opacities. Head CT without contrast did not reveal any acute process. Periventricular white matter disease.     Review of records revealed:  Patient was seen at [facility] cardiology 12/10/21  Started having episodic dizziness recently. Has R eye blurriness and been to eye center earlier this week; rec for cataract extraction. Stays dizzy all the time but when it gets worse she gets nausea. Fine went to bed last night. When lies down long time she gets dizzy.  Got up 3 times to go bathroom last night and felt fine but when got up this AM she was staggering and felt dizzy unable to stand. Got in shower and got dizzy. It passed spontaneously. Riding in the car is worse with nausea. Worse tilting her head back. Had vertigo years before and took dramaine with good relief. This feels similarly but the blurriness is new. No chest pain. Had cervical spine surgery in the past and has L arm numbness. She has posterior neck pain. Last few years the pain in her neck has gradually worsened. Rare headaches. Can no longer get BP in L arm due to lack of arm pulses. Sleeps a lot and is fatigued. This is unlike her. Taking dramamine and may contribute. Here with daughter who provider collateral history. Spoke to PCP about her condition prior to visit. Patient seen in PCP clinic few days prior after ED visits. (both local and ED visits) Labs reassuring. CT brain without ICP. CT chest without PE. She was orthostatic in the office and passed out upon standing. This prompted local ED visit. BMP normal. covid negative.   Echo EF >55% moderate LVH     Patient also seen at [facility] in [place] and had negative head CT and positive urinalysis for UTI and was discharged on antibiotics.     When seen, patient was still complaining of feeling that her tongue was swollen as well as her lips and some tightness in her throat from the episode last night. Mild slurring of her speech. The next day, patient still complaining of feelings of lightheadedness and still some unsteadiness. Speech is still slightly slurred. Carotid Doppler study did not reveal any significant stenosis. Brain MRI without contrast revealed small acute infarcts bilateral cerebellar hemisphere and small acute infarct right occipital lobe. Labs done revealed increased WBC 15.2, increased platelet at 087, increased magnesium at 2.5 and unremarkable CMP.   Patient was discharged on 12/15/2021 on Plavix 75 mg daily, atorvastatin 20 mg daily and aspirin 81 mg daily. Since then, patient reports improvement of her condition. Still having some sense of lightheadedness but improvement of the feeling of lightheadedness. Physical therapy did come to her home for 5 sessions. OT came once and she was taught to do arm exercises using water bottle which caused her to develop some neck and shoulder pains. Per patient she currently sleeps a lot and admits to feeling sleepy during the day. Reports vivid dreams. Reports problems with blurring of vision and was previously evaluated by ophthalmology and was told that she needed cataract surgery. Patient had a lot of questions in relation to her recent stroke plus other medical issues. Outside reports reviewed: ER records, radiology reports, lab reports, historical medical records. Review of Systems:    A comprehensive review of systems was performed:   Constitutional: positive for none  Eyes: positive for none  Ears, nose, mouth, throat, and face: positive for none  Respiratory: positive for none  Cardiovascular: positive for none  Gastrointestinal: positive for none  Genitourinary: positive for none  Integument/breast: positive for none  Hematologic/lymphatic: positive for none  Musculoskeletal: positive for none  Neurological: positive for none  Behavioral/Psych: positive for none  Endocrine: positive for none  Allergic/Immunologic: positive for none      Objective: There were no vitals taken for this visit. PHYSICAL EXAM:    NEUROLOGICAL EXAM:    Appearance: The patient is obese, provides a coherent history and is in no acute distress. Mental Status: Oriented to time, place and person. Fluent, no aphasia or dysarthria. Mood and affect appropriate. Cranial Nerves:   Intact visual fields. SANA, EOM's full, no nystagmus, no ptosis. Facial sensation is normal. Corneal reflexes are intact. Facial movement is symmetric. Hearing is normal bilaterally.  Palate is midline with normal elevation. Sternocleidomastoid and trapezius muscles are normal. Tongue is midline. Motor:  5/5 strength in upper and lower proximal and distal muscles. Normal bulk and tone. No fasciculations. No pronator drift. Reflexes:   Deep tendon reflexes 1+/4 and symmetrical. Downgoing toes. Sensory:   Normal to cold, pinprick and vibration. Gait:  Normal gait. No Romberg but with some truncal instability. Problems with tandem walking. Tremor:   No tremor noted. Cerebellar:  Intact FTN/ADRIANA/HTS. Imaging  CT Head, head/neck CTA, brain MRI: reviewed    Lab Review      Assessment:   Acute CVA  Atherosclerotic cerebrovascular disease  Hyperlipidemia  Idiopathic hypersomnia without long sleep time  Therapeutic drug monitoring    Plan:   Neurological examination reveals mild gait instability. Status post posterior right occipital and bilateral cerebellar infarcts. Head CT without contrast did not reveal any acute process. Periventricular white matter disease. Brain MRI without contrast revealed small acute infarct right greater than left cerebellar hemisphere and small acute infarct right occipital lobe. Echocardiogram did not reveal any PFO. Carotid Doppler studies did not reveal any significant ICA stenosis. Patient was advised to see her ophthalmologist for the vision issues and to also have [employer]'s visual field testing done to assess for any visual field defect. Advised to do routine structured physical exercises. Call 911 if new deficits occur. Continue aspirin 81 mg daily and Plavix 75 mg daily for stroke prophylaxis. Check for efficacy. Advised that improvement from stroke may take as long as a year to 18 months. Continue exercises taught by therapy. With regards to her issue with her left arm, patient advised to discuss with PCP or cardiologist for possible arterial Doppler studies.     Head and neck CTA revealed bilateral vertebral arteries that are diminutive in caliber with severe atherosclerotic changes. Long segment occlusion in the right cervical vertebral artery. Moderate to severe atherosclerotic changes in the cavernous internal carotid artery. Moderate atherosclerosis in the left posterior cerebral artery. Continue dual antiplatelet therapy. LDL was elevated at 92.2. Should be less than 70. Advised compliance with dietary modifications and medications for hyperlipidemia. Continue atorvastatin 20 mg daily. Patient provided with DASH diet. Patient having issues with sleep. Need to look for sleep disorder especially obstructive sleep apnea which is a stroke risk. Patient referred to sleep medicine for further evaluation. Aspirin and Plavix laboratory testing for efficacy was ordered. All questions and concerns were answered. Visit lasted 50 minutes. Greater than 50% was spent reviewing her hospitalization records as summarized above, discussion about her condition, etiology, prognosis, stroke risk and prevention, routine structured physical exercises, fall precautions, strict compliance with dietary modifications and medications for hyperlipidemia, referred to sleep medicine, laboratory work-up, treatment options, medication    This note was created using voice recognition software. Despite editing, there may be syntax errors.

## 2022-01-11 NOTE — LETTER
1/12/2022    Patient: Cara Hicks   YOB: 1945   Date of Visit: 1/11/2022     Aby Xiong, [address] [place] [address]  Via Fax: [phone]    Dear Aby Xiong DO,      Thank you for referring Ms. Camila Vásquez to [facility] for evaluation. My notes for this consultation are attached. If you have questions, please do not hesitate to call me. I look forward to following your patient along with you.       Sincerely,    Deyanira Whitlock MD

## 2022-01-11 NOTE — PATIENT INSTRUCTIONS
Learning About How to Prevent a Stroke  What is a stroke? A stroke occurs when a blood vessel in the brain bursts or is blocked by a blood clot. Without blood and the oxygen it carries, part of the brain starts to die. The part of the body controlled by the damaged area of the brain can't work properly. But there are many things you can do to help lower your stroke risk. What increases your risk for stroke? A risk factor is anything that makes you more likely to have a particular health problem. Risk factors for stroke that you can treat or change include:  · Health problems like high blood pressure, atrial fibrillation, diabetes, and high cholesterol. · Smoking. · Heavy use of alcohol. · Being overweight. · Not getting enough physical activity. Risk factors you can't change include:  · Age. The risk of stroke goes up as you get older. · Race.  Americans, Native Americans, and [place] Natives have a higher risk than those of other races. · Being female. Women have a higher risk of stroke than men. · Family history of stroke. Your doctor can help you know your risk. Then you and your can doctor talk about whether you need to lower it. What can you do to prevent a stroke? · Treat any health problems you have that raise your risk. · Adopt a heart-healthy lifestyle:  ? Don't smoke. If you need help quitting, talk to your doctor about stop-smoking programs and medicines. These can increase your chances of quitting for good. ? Limit alcohol to 2 drinks a day for men and 1 drink a day for women. ? Stay at a healthy weight. Lose weight if you need to.  ? If your doctor recommends it, get more exercise. Walking is a good choice. Bit by bit, increase the amount you walk every day. Try for at least 30 minutes on most days of the week. ? Eat heart-healthy foods. These include fruits, vegetables, high-fiber foods, and fish.  Choose foods that are low in sodium, saturated fat, and trans fat.  · Decide with your doctor whether you will also take medicines to help lower your risk. For example, you and your doctor may decide you will take a medicine that prevents blood clots. What are the symptoms of a stroke? Symptoms of a stroke happen quickly. A stroke may cause:  · Sudden numbness, tingling, weakness, or loss of movement in your face, arm, or leg, especially on only one side of your body. · Sudden vision changes. · Sudden trouble speaking. · Sudden confusion or trouble understanding simple statements. · Sudden problems with walking or balance. · A sudden, severe headache that is different from past headaches. FAST is a simple way to remember the main symptoms of stroke. Recognizing these symptoms helps you know when to call for medical help. FAST stands for:  · F ace drooping. · A rm weakness. · S peech difficulty. · T jana to call 911. It's important to call for medical help if you have stroke symptoms. Quick treatment may save your life. And it may reduce the damage in your brain so that you have fewer problems after the stroke. Follow-up care is a key part of your treatment and safety. Be sure to make and go to all appointments, and call your doctor if you are having problems. It's also a good idea to know your test results and keep a list of the medicines you take. Where can you learn more? Go to http://www.gray.com/  Enter G757 in the search box to learn more about \"Learning About How to Prevent a Stroke. \"  Current as of: July 6, 2021               Content Version: 13.0  © 2006-2021 Healthwise, [facility]. Care instructions adapted under license by [employer] (which disclaims liability or warranty for this information). If you have questions about a medical condition or this instruction, always ask your healthcare professional. [place] [address] any warranty or liability for your use of this information.          Learning About How to Prevent Another Stroke  What can you do to prevent another stroke? After a stroke, people feel lots of different emotions. Some people are worried that they could have another stroke. Or they may feel overwhelmed by how much there is to learn and do. Some people feel sad or depressed. No matter what emotions you are feeling, you can give yourself some control and peace of mind by making a plan to lower your risk of having another stroke. Take your medicines  You'll need to take medicines to help prevent another stroke. Be sure to take your medicines exactly as prescribed. And don't stop taking them unless your doctor tells you to. If you stop taking your medicines, you can increase your risk of having another stroke. Some of the medicines your doctor may prescribe include:  · Aspirin or some other blood thinner to prevent blood clots. · Statins and other medicines to lower cholesterol. · Blood pressure medicines to lower blood pressure. Manage other health problems  You can help lower your chance of having another stroke by managing certain other health problems. Problems that increase your risk of having another stroke include:  · High blood pressure. · High cholesterol. · Atrial fibrillation. · Diabetes. If you have any of these health problems, you can manage them with healthy lifestyle changes along with medicine. Adopt a healthy lifestyle  · Do not smoke or allow others to smoke around you. If you need help quitting, talk to your doctor about stop-smoking programs and medicines. These can increase your chances of quitting for good. Smoking makes a stroke more likely. · Limit alcohol to 2 drinks a day for men and 1 drink a day for women. · Lose weight if you need to. Controlling your weight will help you keep your heart and body healthy. · Be active. Ask your doctor what type and level of activity is safe for you.   · Eat heart-healthy foods, like fruits, vegetables, and high-fiber foods.  It's also important to:  · Get a flu shot every year. · Ask for help if you think you are depressed. Do stroke rehab  Taking part in a stroke rehabilitation (rehab) program will help you to regain skills you lost or make the most of your abilities after a stroke. It also helps you take steps to prevent another stroke. Your rehab team will give you education and support to help you build new, healthy habits. You'll learn how to manage any other health problems that you might have. Jonah Otoole also learn how to exercise safely, eat a healthy diet, and quit smoking if you smoke. Jonah Otoole work with your team to decide what lifestyle choices are best for you. If your doctor hasn't already suggested it, ask him or her if stroke rehab is right for you. Know stroke symptoms  Make sure you know the symptoms of stroke. FAST is a simple way to remember. Recognizing these symptoms helps you know when to call for medical help. FAST stands for:  · F ace drooping. · A rm weakness. · S peech difficulty. · T jana to call 911. Follow-up care is a key part of your treatment and safety. Be sure to make and go to all appointments, and call your doctor if you are having problems. It's also a good idea to know your test results and keep a list of the medicines you take. Where can you learn more? Go to http://www.gray.com/  Enter F126 in the search box to learn more about \"Learning About How to Prevent Another Stroke. \"  Current as of: July 6, 2021               Content Version: 13.0  © 2006-2021 Healthwise, [employer]. Care instructions adapted under license by [employer] (which disclaims liability or warranty for this information). If you have questions about a medical condition or this instruction, always ask your healthcare professional. [address] any warranty or liability for your use of this information.          DASH Diet: Care Instructions  Your Care Instructions     The DASH diet is an eating plan that can help lower your blood pressure. DASH stands for Dietary Approaches to Stop Hypertension. Hypertension is high blood pressure. The DASH diet focuses on eating foods that are high in calcium, potassium, and magnesium. These nutrients can lower blood pressure. The foods that are highest in these nutrients are fruits, vegetables, low-fat dairy products, nuts, seeds, and legumes. But taking calcium, potassium, and magnesium supplements instead of eating foods that are high in those nutrients does not have the same effect. The DASH diet also includes whole grains, fish, and poultry. The DASH diet is one of several lifestyle changes your doctor may recommend to lower your high blood pressure. Your doctor may also want you to decrease the amount of sodium in your diet. Lowering sodium while following the DASH diet can lower blood pressure even further than just the DASH diet alone. Follow-up care is a key part of your treatment and safety. Be sure to make and go to all appointments, and call your doctor if you are having problems. It's also a good idea to know your test results and keep a list of the medicines you take. How can you care for yourself at home? Following the DASH diet  · Eat 4 to 5 servings of fruit each day. A serving is 1 medium-sized piece of fruit, ½ cup chopped or canned fruit, 1/4 cup dried fruit, or 4 ounces (½ cup) of fruit juice. Choose fruit more often than fruit juice. · Eat 4 to 5 servings of vegetables each day. A serving is 1 cup of lettuce or raw leafy vegetables, ½ cup of chopped or cooked vegetables, or 4 ounces (½ cup) of vegetable juice. Choose vegetables more often than vegetable juice. · Get 2 to 3 servings of low-fat and fat-free dairy each day. A serving is 8 ounces of milk, 1 cup of yogurt, or 1 ½ ounces of cheese. · Eat 6 to 8 servings of grains each day.  A serving is 1 slice of bread, 1 ounce of dry cereal, or ½ cup of cooked rice, pasta, or cooked cereal. Try to choose whole-grain products as much as possible. · Limit lean meat, poultry, and fish to 2 servings each day. A serving is 3 ounces, about the size of a deck of cards. · Eat 4 to 5 servings of nuts, seeds, and legumes (cooked dried beans, lentils, and split peas) each week. A serving is 1/3 cup of nuts, 2 tablespoons of seeds, or ½ cup of cooked beans or peas. · Limit fats and oils to 2 to 3 servings each day. A serving is 1 teaspoon of vegetable oil or 2 tablespoons of salad dressing. · Limit sweets and added sugars to 5 servings or less a week. A serving is 1 tablespoon jelly or jam, ½ cup sorbet, or 1 cup of lemonade. · Eat less than 2,300 milligrams (mg) of sodium a day. If you limit your sodium to 1,500 mg a day, you can lower your blood pressure even more. · Be aware that all of these are the suggested number of servings for people who eat 1,800 to 2,000 calories a day. Your recommended number of servings may be different if you need more or fewer calories. Tips for success  · Start small. Do not try to make dramatic changes to your diet all at once. You might feel that you are missing out on your favorite foods and then be more likely to not follow the plan. Make small changes, and stick with them. Once those changes become habit, add a few more changes. · Try some of the following:  ? Make it a goal to eat a fruit or vegetable at every meal and at snacks. This will make it easy to get the recommended amount of fruits and vegetables each day. ? Try yogurt topped with fruit and nuts for a snack or healthy dessert. ? Add lettuce, tomato, cucumber, and onion to sandwiches. ? Combine a ready-made pizza crust with low-fat mozzarella cheese and lots of vegetable toppings. Try using tomatoes, squash, spinach, broccoli, carrots, cauliflower, and onions. ?  Have a variety of cut-up vegetables with a low-fat dip as an appetizer instead of chips and dip.  ? Sprinkle sunflower seeds or chopped almonds over salads. Or try adding chopped walnuts or almonds to cooked vegetables. ? Try some vegetarian meals using beans and peas. Add garbanzo or kidney beans to salads. Make burritos and tacos with mashed monahan beans or black beans. Where can you learn more? Go to http://www.varma.com/  Enter H967 in the search box to learn more about \"DASH Diet: Care Instructions. \"  Current as of: April 29, 2021               Content Version: 13.0  © 0967-5974 [employer]. Care instructions adapted under license by [employer] (which disclaims liability or warranty for this information). If you have questions about a medical condition or this instruction, always ask your healthcare professional. [address] any warranty or liability for your use of this information.

## 2022-01-13 ENCOUNTER — TELEPHONE (OUTPATIENT)
Dept: NEUROLOGY | Age: 77
End: 2022-01-13

## 2022-01-13 NOTE — TELEPHONE ENCOUNTER
Kimberli Gann stated that she spoke with someone about faxing documents over and gave them the wrong fax number. Correct fax number is [phone]. Please call back if any questions.

## 2022-01-14 NOTE — TELEPHONE ENCOUNTER
Patient's daughter stated the order needs to be on a [facility] header rather than [facility]. Needs to be reordered and faxed to [phone].

## 2022-01-21 NOTE — TELEPHONE ENCOUNTER
Returned call to the daughter as well as the patient to let them know the [facility] is the only location that does that particular test. She has the lab requisition and will go to the [facility] lab and have it drawn. The daughter will be driving the patient since she can not drive. The daughter has verbalized understanding.

## 2022-02-01 ENCOUNTER — HOSPITAL ENCOUNTER (OUTPATIENT)
Dept: LAB | Age: 77
Discharge: HOME | End: 2022-02-01

## 2022-02-01 DIAGNOSIS — Z51.81 THERAPEUTIC DRUG MONITORING: ICD-10-CM

## 2022-02-01 DIAGNOSIS — I63.9 ACUTE CVA (CEREBROVASCULAR ACCIDENT) (HCC): ICD-10-CM

## 2022-02-01 LAB
ASPIRIN TEST, ASPIRN: 363 ARU
P2Y12 PLT RESPONSE,PPPR: 147 PRU (ref 194–418)

## 2022-02-15 NOTE — PROGRESS NOTES
Spoke to patient and informed both aspirin and plavix is efficacious. Concerned for planned testing which sounds like rotary chair and etc. Advised that it will be not an issue from her stroke standpoint. She mentions doing stationary bike and walking 1 mile as exercise. Encouraged to continue. Advised to treadmill exercise is not a good idea. Patient did mention she saw ophthalmology and had the visual field testing done. Supposed to get a new set of glasses. Patient also mentions planned cataract surgery 9 months. Advised patient since she is taking aspirin Plavix a day may need clearance from us. She understood. All questions and concerns were answered.

## 2022-02-24 ENCOUNTER — TELEPHONE (OUTPATIENT)
Dept: NEUROLOGY | Age: 77
End: 2022-02-24

## 2022-02-25 NOTE — TELEPHONE ENCOUNTER
Earlier message was from PCP's office about patient's hospital stay and that she had another mini stroke. Msg forwarded to provider. HE is aware.

## 2022-02-28 ENCOUNTER — TELEPHONE (OUTPATIENT)
Dept: NEUROLOGY | Age: 77
End: 2022-02-28

## 2022-02-28 NOTE — TELEPHONE ENCOUNTER
Dr Nash Suárez wanted to speak with the doctor directly about Patient.  Please call him at [phone]  Ext [phone]

## 2022-03-02 ENCOUNTER — OFFICE VISIT (OUTPATIENT)
Dept: NEUROLOGY | Age: 77
End: 2022-03-02
Payer: MEDICARE

## 2022-03-02 VITALS — SYSTOLIC BLOOD PRESSURE: 112 MMHG | DIASTOLIC BLOOD PRESSURE: 66 MMHG | RESPIRATION RATE: 20 BRPM

## 2022-03-02 DIAGNOSIS — G47.12 IDIOPATHIC HYPERSOMNIA WITHOUT LONG SLEEP TIME: ICD-10-CM

## 2022-03-02 DIAGNOSIS — I63.9 ACUTE CVA (CEREBROVASCULAR ACCIDENT) (HCC): Primary | ICD-10-CM

## 2022-03-02 DIAGNOSIS — I67.2 ATHEROSCLEROTIC CEREBROVASCULAR DISEASE: ICD-10-CM

## 2022-03-02 DIAGNOSIS — E78.2 MIXED HYPERLIPIDEMIA: ICD-10-CM

## 2022-03-02 PROCEDURE — 1090F PRES/ABSN URINE INCON ASSESS: CPT | Performed by: PSYCHIATRY & NEUROLOGY

## 2022-03-02 PROCEDURE — 3017F COLORECTAL CA SCREEN DOC REV: CPT | Performed by: PSYCHIATRY & NEUROLOGY

## 2022-03-02 PROCEDURE — G8432 DEP SCR NOT DOC, RNG: HCPCS | Performed by: PSYCHIATRY & NEUROLOGY

## 2022-03-02 PROCEDURE — 99215 OFFICE O/P EST HI 40 MIN: CPT | Performed by: PSYCHIATRY & NEUROLOGY

## 2022-03-02 PROCEDURE — G8427 DOCREV CUR MEDS BY ELIG CLIN: HCPCS | Performed by: PSYCHIATRY & NEUROLOGY

## 2022-03-02 PROCEDURE — 1101F PT FALLS ASSESS-DOCD LE1/YR: CPT | Performed by: PSYCHIATRY & NEUROLOGY

## 2022-03-02 PROCEDURE — G8417 CALC BMI ABV UP PARAM F/U: HCPCS | Performed by: PSYCHIATRY & NEUROLOGY

## 2022-03-02 PROCEDURE — G8400 PT W/DXA NO RESULTS DOC: HCPCS | Performed by: PSYCHIATRY & NEUROLOGY

## 2022-03-02 PROCEDURE — G8536 NO DOC ELDER MAL SCRN: HCPCS | Performed by: PSYCHIATRY & NEUROLOGY

## 2022-03-02 NOTE — PATIENT INSTRUCTIONS
Learning About How to Prevent a Stroke  What is a stroke? A stroke occurs when a blood vessel in the brain bursts or is blocked by a blood clot. Without blood and the oxygen it carries, part of the brain starts to die. The part of the body controlled by the damaged area of the brain can't work properly. But there are many things you can do to help lower your stroke risk. What increases your risk for stroke? A risk factor is anything that makes you more likely to have a particular health problem. Risk factors for stroke that you can treat or change include:  · Health problems like high blood pressure, atrial fibrillation, diabetes, and high cholesterol. · Smoking. · Heavy use of alcohol. · Being overweight. · Not getting enough physical activity. Risk factors you can't change include:  · Age. The risk of stroke goes up as you get older. · Race.  Americans, Native Americans, and [place] Natives have a higher risk than those of other races. · Being female. Women have a higher risk of stroke than men. · Family history of stroke. Your doctor can help you know your risk. Then you and your can doctor talk about whether you need to lower it. What can you do to prevent a stroke? · Treat any health problems you have that raise your risk. · Adopt a heart-healthy lifestyle:  ? Don't smoke. If you need help quitting, talk to your doctor about stop-smoking programs and medicines. These can increase your chances of quitting for good. ? Limit alcohol to 2 drinks a day for men and 1 drink a day for women. ? Stay at a healthy weight. Lose weight if you need to.  ? If your doctor recommends it, get more exercise. Walking is a good choice. Bit by bit, increase the amount you walk every day. Try for at least 30 minutes on most days of the week. ? Eat heart-healthy foods. These include fruits, vegetables, high-fiber foods, and fish.  Choose foods that are low in sodium, saturated fat, and trans fat.  · Decide with your doctor whether you will also take medicines to help lower your risk. For example, you and your doctor may decide you will take a medicine that prevents blood clots. What are the symptoms of a stroke? Symptoms of a stroke happen quickly. A stroke may cause:  · Sudden numbness, tingling, weakness, or loss of movement in your face, arm, or leg, especially on only one side of your body. · Sudden vision changes. · Sudden trouble speaking. · Sudden confusion or trouble understanding simple statements. · Sudden problems with walking or balance. · A sudden, severe headache that is different from past headaches. FAST is a simple way to remember the main symptoms of stroke. Recognizing these symptoms helps you know when to call for medical help. FAST stands for:  · F ace drooping. · A rm weakness. · S peech difficulty. · T jana to call 911. It's important to call for medical help if you have stroke symptoms. Quick treatment may save your life. And it may reduce the damage in your brain so that you have fewer problems after the stroke. Follow-up care is a key part of your treatment and safety. Be sure to make and go to all appointments, and call your doctor if you are having problems. It's also a good idea to know your test results and keep a list of the medicines you take. Where can you learn more? Go to http://www.gray.com/  Enter G757 in the search box to learn more about \"Learning About How to Prevent a Stroke. \"  Current as of: July 6, 2021               Content Version: 13.0  © 2006-2021 Healthwise, [facility]. Care instructions adapted under license by [employer] (which disclaims liability or warranty for this information). If you have questions about a medical condition or this instruction, always ask your healthcare professional. [place] [address] any warranty or liability for your use of this information.          Learning About How to Prevent Another Stroke  What can you do to prevent another stroke? After a stroke, people feel lots of different emotions. Some people are worried that they could have another stroke. Or they may feel overwhelmed by how much there is to learn and do. Some people feel sad or depressed. No matter what emotions you are feeling, you can give yourself some control and peace of mind by making a plan to lower your risk of having another stroke. Take your medicines  You'll need to take medicines to help prevent another stroke. Be sure to take your medicines exactly as prescribed. And don't stop taking them unless your doctor tells you to. If you stop taking your medicines, you can increase your risk of having another stroke. Some of the medicines your doctor may prescribe include:  · Aspirin or some other blood thinner to prevent blood clots. · Statins and other medicines to lower cholesterol. · Blood pressure medicines to lower blood pressure. Manage other health problems  You can help lower your chance of having another stroke by managing certain other health problems. Problems that increase your risk of having another stroke include:  · High blood pressure. · High cholesterol. · Atrial fibrillation. · Diabetes. If you have any of these health problems, you can manage them with healthy lifestyle changes along with medicine. Adopt a healthy lifestyle  · Do not smoke or allow others to smoke around you. If you need help quitting, talk to your doctor about stop-smoking programs and medicines. These can increase your chances of quitting for good. Smoking makes a stroke more likely. · Limit alcohol to 2 drinks a day for men and 1 drink a day for women. · Lose weight if you need to. Controlling your weight will help you keep your heart and body healthy. · Be active. Ask your doctor what type and level of activity is safe for you.   · Eat heart-healthy foods, like fruits, vegetables, and high-fiber foods.  It's also important to:  · Get a flu shot every year. · Ask for help if you think you are depressed. Do stroke rehab  Taking part in a stroke rehabilitation (rehab) program will help you to regain skills you lost or make the most of your abilities after a stroke. It also helps you take steps to prevent another stroke. Your rehab team will give you education and support to help you build new, healthy habits. You'll learn how to manage any other health problems that you might have. Jyotsna Bradley also learn how to exercise safely, eat a healthy diet, and quit smoking if you smoke. Jyotsna Bradley work with your team to decide what lifestyle choices are best for you. If your doctor hasn't already suggested it, ask him or her if stroke rehab is right for you. Know stroke symptoms  Make sure you know the symptoms of stroke. FAST is a simple way to remember. Recognizing these symptoms helps you know when to call for medical help. FAST stands for:  · F ace drooping. · A rm weakness. · S peech difficulty. · T jana to call 911. Follow-up care is a key part of your treatment and safety. Be sure to make and go to all appointments, and call your doctor if you are having problems. It's also a good idea to know your test results and keep a list of the medicines you take. Where can you learn more? Go to http://www.gray.com/  Enter W811 in the search box to learn more about \"Learning About How to Prevent Another Stroke. \"  Current as of: July 6, 2021               Content Version: 13.0  © 2006-2021 Healthwise, [employer]. Care instructions adapted under license by [employer] (which disclaims liability or warranty for this information). If you have questions about a medical condition or this instruction, always ask your healthcare professional. [address] any warranty or liability for your use of this information.

## 2022-03-02 NOTE — PROGRESS NOTES
NEUROLOGY CLINIC NOTE    Patient ID:  Damian Mayo  [id number]  89 y.o.  1945    Date of Visit:  March 2, 2022    Reason for Visit:  Stroke    Chief Complaint   Patient presents with    Follow-up     stroke        History of Present Illness:     Patient Active Problem List    Diagnosis Date Noted    Syncope and collapse 12/14/2021    Left arm weakness 12/14/2021     Past Medical History:   Diagnosis Date    Glaucoma     History of mammography, screening 04/23/2019    Normal    Pap smear for cervical cancer screening 04/23/2019    Negative      Past Surgical History:   Procedure Laterality Date    HX ORTHOPAEDIC  1989    lower back surgery    HX ORTHOPAEDIC  2003    cervical neck surgery      Prior to Admission medications    Medication Sig Start Date End Date Taking? Authorizing Provider   ticagrelor (BRILINTA) 60 mg tab tablet Take 60 mg by mouth two (2) times a day. 2/22/22 5/23/22 Yes Provider, Historical   atorvastatin (LIPITOR) 20 mg tablet Take 1 Tablet by mouth daily. 12/16/21  Yes Carmen Blackwood DO   LUMIGAN 0.01 % ophthalmic drops INSTILL 1 DROP INTO BOTH EYES EVERY DAY AT NIGHT 2/27/19  Yes Provider, Historical   aspirin 81 mg chewable tablet Take 81 mg by mouth every seven (7) days. Yes Provider, Historical   clopidogreL (PLAVIX) 75 mg tab Take 1 Tablet by mouth daily. Patient not taking: Reported on 3/2/2022 12/16/21   Livia Langston DO     Allergies   Allergen Reactions    Penicillins Anaphylaxis    Iodinated Contrast Media Shortness of Breath and Swelling      Social History     Tobacco Use    Smoking status: Never Smoker    Smokeless tobacco: Never Used   Substance Use Topics    Alcohol use: Never      Family History   Problem Relation Age of Onset    Heart Disease Mother     Heart Disease Father         Subjective:      Damian Mayo is a 78 y.o.  WF with a history of stroke, atherosclerotic cerebrovascular disease, hyperlipidemia and glaucoma who is here for further evaluation after a recent stroke. Patient was admitted at [facility] last 12/14/2021 for intermittent episodes of dizziness, nausea and near syncopal spells for more than 1 week.   Patient complains of dizziness, nausea and near syncopal episodes since 12/4/2021. Patient has been seen in 4 different ERs the last week and wants answers. She was told likely secondary to vertigo and UTI. Taking medications for vertigo with no relief. Patient after receiving contrast dye at the CT scan developed lethargy was clammy, diaphoretic with slurred speech. Possible allergic reaction to contrast.  Solu-Medrol and Benadryl was administered. Patient also given epi intramuscularly. Patient is mentions her PCP having difficulty obtaining blood pressure on the left arm and difficulty palpating a pulse. Patient was sent to the ER for further evaluation.     In the ER blood pressure was 113/71. Labs done revealed unremarkable urinalysis, troponin, lipid panel (LDL 92.2), respiratory virus panel with COVID-19 PCR and hemoglobin A1c. CBC showing increased WBC at 12.2 and increased platelet at 696. CMP showing increased glucose at 110 and increased calcium at 10.2. Elevated TSH at 5. Head and neck CTA with contrast revealed bilateral vertebral arteries are diminutive in caliber with severe atherosclerotic changes. Long segment occlusion in the right cervical vertebral artery. Underlying dissection is not excluded. Intracranial V4 segments are patent at the level of PICA. Moderate to severe atherosclerotic changes in the cavernous internal carotid artery. Moderate atherosclerosis in the left posterior cerebral artery. A few biapical lung nodules measuring up to 4 mm with patchy groundglass opacities. Head CT without contrast did not reveal any acute process. Periventricular white matter disease.     Review of records revealed:  Patient was seen at [facility] cardiology 12/10/21  Started having episodic dizziness recently.  Has R eye blurriness and been to eye center earlier this week; rec for cataract extraction. Stays dizzy all the time but when it gets worse she gets nausea. Fine went to bed last night. When lies down long time she gets dizzy. Got up 3 times to go bathroom last night and felt fine but when got up this AM she was staggering and felt dizzy unable to stand. Got in shower and got dizzy. It passed spontaneously. Riding in the car is worse with nausea. Worse tilting her head back. Had vertigo years before and took dramaine with good relief. This feels similarly but the blurriness is new. No chest pain. Had cervical spine surgery in the past and has L arm numbness. She has posterior neck pain. Last few years the pain in her neck has gradually worsened. Rare headaches. Can no longer get BP in L arm due to lack of arm pulses. Sleeps a lot and is fatigued. This is unlike her. Taking dramamine and may contribute. Here with daughter who provider collateral history. Spoke to PCP about her condition prior to visit. Patient seen in PCP clinic few days prior after ED visits. (both local and ED visits) Labs reassuring. CT brain without ICP. CT chest without PE. She was orthostatic in the office and passed out upon standing. This prompted local ED visit. BMP normal. covid negative.   Echo EF >55% moderate LVH     Patient also seen at [facility] in [place] and had negative head CT and positive urinalysis for UTI and was discharged on antibiotics.     When seen, patient was still complaining of feeling that her tongue was swollen as well as her lips and some tightness in her throat from the episode last night. Mild slurring of her speech. The next day, patient still complaining of feelings of lightheadedness and still some unsteadiness. Speech is still slightly slurred. Carotid Doppler study did not reveal any significant stenosis.   Brain MRI without contrast revealed small acute infarcts bilateral cerebellar hemisphere and small acute infarct right occipital lobe. Labs done revealed increased WBC 15.2, increased platelet at 368, increased magnesium at 2.5 and unremarkable CMP. Patient was discharged on 12/15/2021 on Plavix 75 mg daily, atorvastatin 20 mg daily and aspirin 81 mg daily. Since the last visit on 1/11/2022, patient had aspirin and platelet function lab testing done 2/1/2022 and both were efficacious. I call the patient and informed her of the lab results and to continue Aspirin and Plavix. Review of records revealed patient was seen by Dr. Karen Lopez at the [address] [facility] stroke clinic last 1/27/2022. Note mentions informing patient of continuing dual antiplatelet therapy for 90 days and then a decision has to be made with regards to single agent afterwards. Note also mentions about loop recorder placement. Then apparently last 2/21/2022, patient had an episode at home where she could not say what she wanted to say and was talking nonsensically. No slurring of speech. She texted her daughter and it was nonsensical.  It lasted for about 1 hour. Son-in-law called EMS and patient was transported to [facility] at [place] and then [facility] to [facility] in [place]. Labs done revealed unremarkable urinalysis, COVID-19 testing, troponin, CMP, lipid panel (LDL 37), PT, PTT, INR and drug screen. EKG done revealed normal sinus rhythm. Chest x-ray revealed fusion hardware noted over the lower spine but otherwise no acute process. Head CT without contrast did not reveal any acute intracranial abnormality. Also thickening of the paranasal sinuses suggest presence of underlying sinusitis. Carotid ultrasound done revealed minimal bilateral carotid artery occlusive disease. Occlusion of the right vertebral artery and moderate stenosis of the left vertebral artery.   MRA of the head revealed long segment moderate to severe stenosis left posterior cerebral artery, short segment critical stenosis of proximal left cavernous carotid artery. Severe stenosis distal left cavernous carotid artery. Short segment severe stenosis proximal right cavernous carotid artery. Brain MRI done revealed restricted diffusion right occipital lobe, left posterior corpus callosum, left frontal lobe white matter. Periventricular and punctate white matter changes probably secondary to small vessel ischemic disease. Findings consistent with multifocal bilateral cerebral subacute infarcts. Transthoracic echocardiogram done revealed EF of 60 to 65%. No thrombus. Neuro interventional service was consulted and no intervention was recommended. Plavix was discontinued and patient was started on Brilinta. Aspirin was continued. 30-day event monitor was ordered. Advised to follow-up with neurology. Patient was discharged 2/22/2022. Since then, patient reports no recurrence of problems with seeing what she wants to say or talking or texting nonsensically. No significant issues with dizziness or vertigo. Walking continues to be better. No falls. Patient still has issues with feeling sleepy during the day and having vivid dreams. Chronic problems with blurring of vision. Outside reports reviewed: ER records, radiology reports, lab reports, historical medical records.     Review of Systems:    A comprehensive review of systems was performed:   Constitutional: positive for none  Eyes: positive for none  Ears, nose, mouth, throat, and face: positive for none  Respiratory: positive for none  Cardiovascular: positive for none  Gastrointestinal: positive for none  Genitourinary: positive for none  Integument/breast: positive for none  Hematologic/lymphatic: positive for none  Musculoskeletal: positive for none  Neurological: positive for none  Behavioral/Psych: positive for none  Endocrine: positive for none  Allergic/Immunologic: positive for none      Objective:     Visit Vitals  /66 (BP 1 Location: Left arm, BP Patient Position: Sitting, BP Cuff Size: Adult)   Resp 20       PHYSICAL EXAM:    NEUROLOGICAL EXAM:    Appearance: The patient is obese, provides a coherent history and is in no acute distress. Mental Status: Oriented to time, place and person. Fluent, no aphasia or dysarthria. Mood and affect appropriate. Cranial Nerves:   Intact visual fields. SANA, EOM's full, no nystagmus, no ptosis. Facial sensation is normal. Corneal reflexes are intact. Facial movement is symmetric. Hearing is normal bilaterally. Palate is midline with normal elevation. Sternocleidomastoid and trapezius muscles are normal. Tongue is midline. Motor:  5/5 strength in upper and lower proximal and distal muscles. Normal bulk and tone. No fasciculations. No pronator drift. Reflexes:   Deep tendon reflexes 1+/4 and symmetrical. Downgoing toes. Sensory:   Normal to cold, pinprick and vibration. Gait:  Normal gait. No Romberg but with some truncal instability. Problems with tandem walking. Tremor:   No tremor noted. Cerebellar:  Intact FTN/ADRIANA/HTS. 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 No residual cognitive deficits from recent stroke. Brain MRI done revealed restricted diffusion right occipital lobe, left posterior corpus callosum, left frontal lobe white matter. Periventricular and punctate white matter changes probably secondary to small vessel ischemic disease. Findings consistent with multifocal bilateral cerebral subacute infarcts. Patient is also status post posterior right occipital and bilateral cerebellar infarcts. Head CT without contrast did not reveal any acute process. Periventricular white matter disease.   Previous brain MRI without contrast revealed small acute infarct right greater than left cerebellar hemisphere and small acute infarct right occipital lobe. Repeat echocardiogram at [facility] was unremarkable. Prior echocardiogram at [facility] did not reveal any PFO. Repeat carotid Doppler studies did not reveal any significant ICA stenosis. Patient again advised to see her ophthalmologist for the vision issues and to also have Abbott's visual field testing done to assess for any visual field defect. Advised to do routine structured physical exercises. Call 911 if new deficits occur. Continue aspirin 81 mg daily and Brilinta 60 mg BID for stroke prophylaxis. Aspirin on previous check was efficacious. Call 911 if new deficits occur. Recent MRA of the head revealed long segment moderate to severe stenosis left posterior cerebral artery, short segment critical stenosis of proximal left cavernous carotid artery. Severe stenosis distal left cavernous carotid artery. Short segment severe stenosis proximal right cavernous carotid artery. Based on most recent MRA there seems to be some improvement as there was no right vertebral artery occlusion seen. Previous head and neck CTA revealed bilateral vertebral arteries that are diminutive in caliber with severe atherosclerotic changes. Long segment occlusion in the right cervical vertebral artery. Moderate to severe atherosclerotic changes in the cavernous internal carotid artery. Moderate atherosclerosis in the left posterior cerebral artery. Continue Aspirin and Brilinta. Last LDL at [facility] was at goal (>70). Advised compliance with dietary modifications and medications for hyperlipidemia. Continue atorvastatin 20 mg daily. Previously provided with DASH diet. Patient having issues with sleep. Need to look for sleep disorder especially obstructive sleep apnea which is a stroke risk. Patient referred to sleep medicine for further evaluation. All questions and concerns were answered.     Visit lasted 50 minutes. Greater than 50% was spent reviewing her hospitalization records as summarized above, discussion about her condition, etiology, prognosis, stroke risk and prevention, routine structured physical exercises, fall precautions, strict compliance with dietary modifications and medications for hyperlipidemia, referred to sleep medicine, treatment options, medication    This note was created using voice recognition software. Despite editing, there may be syntax errors.

## 2022-03-19 PROBLEM — R55 SYNCOPE AND COLLAPSE: Status: ACTIVE | Noted: 2021-12-14

## 2022-03-19 PROBLEM — R29.898 LEFT ARM WEAKNESS: Status: ACTIVE | Noted: 2021-12-14

## 2022-03-22 NOTE — TELEPHONE ENCOUNTER
Pt has called back and needs her Lipitor refilled because she is out. Pt needs this order sent in before 1 pm today because she is relying on family to pick it up for her.

## 2022-03-30 ENCOUNTER — OFFICE VISIT (OUTPATIENT)
Dept: SLEEP MEDICINE | Age: 77
End: 2022-03-30
Payer: MEDICARE

## 2022-03-30 VITALS — BODY MASS INDEX: 27.23 KG/M2 | HEART RATE: 74 BPM | OXYGEN SATURATION: 96 % | WEIGHT: 148 LBS | HEIGHT: 62 IN

## 2022-03-30 DIAGNOSIS — G47.33 OSA (OBSTRUCTIVE SLEEP APNEA): Primary | ICD-10-CM

## 2022-03-30 PROCEDURE — 1090F PRES/ABSN URINE INCON ASSESS: CPT | Performed by: SPECIALIST

## 2022-03-30 PROCEDURE — G8432 DEP SCR NOT DOC, RNG: HCPCS | Performed by: SPECIALIST

## 2022-03-30 PROCEDURE — G8400 PT W/DXA NO RESULTS DOC: HCPCS | Performed by: SPECIALIST

## 2022-03-30 PROCEDURE — 3017F COLORECTAL CA SCREEN DOC REV: CPT | Performed by: SPECIALIST

## 2022-03-30 PROCEDURE — G8427 DOCREV CUR MEDS BY ELIG CLIN: HCPCS | Performed by: SPECIALIST

## 2022-03-30 PROCEDURE — G8417 CALC BMI ABV UP PARAM F/U: HCPCS | Performed by: SPECIALIST

## 2022-03-30 PROCEDURE — 99204 OFFICE O/P NEW MOD 45 MIN: CPT | Performed by: SPECIALIST

## 2022-03-30 PROCEDURE — 1101F PT FALLS ASSESS-DOCD LE1/YR: CPT | Performed by: SPECIALIST

## 2022-03-30 PROCEDURE — G8536 NO DOC ELDER MAL SCRN: HCPCS | Performed by: SPECIALIST

## 2022-03-30 NOTE — PROGRESS NOTES
[address] [facility] [place]. [place], [address]  Tel.  [phone]  Fax. [address] [facility] [address]  [place], [address] [facility]  Tel.  [phone]  Fax. [phone] [address] [place]  Tel.  [phone]  Fax. [phone]       Chief Complaint       Chief Complaint   Patient presents with    Sleep Problem     NP; ref Dr Maria Eugenia Martinez; idopathic hypersomnia without log sleep time       SAIDA Couch is 78 y.o. female seen for evaluation of a sleep disorder. Patient accompanied by her daughter who assists with history. Recent [facility] admission, found to have arterial desaturations. Normally retires between 8-9 PM and will get a bed at 7 AM.  May awaken several times during the night. Nap several times during the day. Has been told of snoring of varying intensity. Notes daytime fatigue worse following CVA. Denies vivid dreaming or nightmares, sleep talking or sleepwalking, nocturnal incontinence, abnormal arm or leg movements, hypnagogue hallucinations, sleep paralysis or cataplexy. The patient has not undergone diagnostic testing for the current problems. Aurora Sleepiness Score: 6       Allergies   Allergen Reactions    Penicillins Anaphylaxis    Iodinated Contrast Media Shortness of Breath and Swelling       Current Outpatient Medications   Medication Sig Dispense Refill    atorvastatin (LIPITOR) 20 mg tablet Take 1 Tablet by mouth daily. 90 Tablet 3    ticagrelor (BRILINTA) 60 mg tab tablet Take 60 mg by mouth two (2) times a day.  LUMIGAN 0.01 % ophthalmic drops INSTILL 1 DROP INTO BOTH EYES EVERY DAY AT NIGHT  12    aspirin 81 mg chewable tablet Take 81 mg by mouth every seven (7) days. She  has a past medical history of Glaucoma, History of mammography, screening (04/23/2019), and Pap smear for cervical cancer screening (04/23/2019).     She  has a past surgical history that includes hx orthopaedic (1989) and  orthopaedic (2003). She family history includes Heart Disease in her father and mother. She  reports that she has never smoked. She has never used smokeless tobacco. She reports that she does not drink alcohol and does not use drugs. Review of Systems:  ROS      Objective:     Visit Vitals  Pulse 74   Ht 5' 2\" (1.575 m)   Wt 148 lb (67.1 kg)   SpO2 96%   BMI 27.07 kg/m²     Body mass index is 27.07 kg/m². General:   Conversant, cooperative   Eyes:  Pupils equal and reactive, no nystagmus   Oropharynx:   Mallampati score III, tongue normal, narrow posterior oral airway       Neck:   No carotid bruits; Neck circ. in \"inches\": 14.5   Chest/Lungs:  Clear on auscultation    CVS:  Normal rate, regular rhythm   Skin:  Warm to touch; no obvious rashes   Neuro: face symmetrical, tongue movement normal   Psych:  Normal affect,  normal countenance        Assessment:       ICD-10-CM ICD-9-CM    1. BHANU (obstructive sleep apnea)  G47.33 327.23      Potential sleep disordered breathing. Patient will be evaluated with a sleep study. Plan:     No orders of the defined types were placed in this encounter. * Patient has a history and examination consistent with the diagnosis of sleep apnea. * Sleep testing was ordered for initial evaluation. * Treatment options if indicated were reviewed today. Instructions:  o Do not engage in activities requiring a normal degree of alertness if fatigue is present. o The patient understands that untreated or undertreated sleep apnea could impair judgement and the ability to function normally during the day.  o Call or return if symptoms worsen or persist.          Wei Hill MD, FAASM  Electronically signed 04/01/22       This note was created using voice recognition software. Despite editing, there may be syntax errors. This note will not be viewable in [address]

## 2022-04-11 ENCOUNTER — OFFICE VISIT (OUTPATIENT)
Dept: NEUROLOGY | Age: 77
End: 2022-04-11
Payer: MEDICARE

## 2022-04-11 VITALS — DIASTOLIC BLOOD PRESSURE: 68 MMHG | RESPIRATION RATE: 20 BRPM | SYSTOLIC BLOOD PRESSURE: 106 MMHG

## 2022-04-11 DIAGNOSIS — Z86.73 HISTORY OF CVA (CEREBROVASCULAR ACCIDENT): Primary | ICD-10-CM

## 2022-04-11 DIAGNOSIS — E78.2 MIXED HYPERLIPIDEMIA: ICD-10-CM

## 2022-04-11 DIAGNOSIS — I67.2 ATHEROSCLEROTIC CEREBROVASCULAR DISEASE: ICD-10-CM

## 2022-04-11 DIAGNOSIS — G47.12 IDIOPATHIC HYPERSOMNIA WITHOUT LONG SLEEP TIME: ICD-10-CM

## 2022-04-11 PROCEDURE — G8400 PT W/DXA NO RESULTS DOC: HCPCS | Performed by: PSYCHIATRY & NEUROLOGY

## 2022-04-11 PROCEDURE — G8432 DEP SCR NOT DOC, RNG: HCPCS | Performed by: PSYCHIATRY & NEUROLOGY

## 2022-04-11 PROCEDURE — 99215 OFFICE O/P EST HI 40 MIN: CPT | Performed by: PSYCHIATRY & NEUROLOGY

## 2022-04-11 PROCEDURE — 1090F PRES/ABSN URINE INCON ASSESS: CPT | Performed by: PSYCHIATRY & NEUROLOGY

## 2022-04-11 PROCEDURE — G8536 NO DOC ELDER MAL SCRN: HCPCS | Performed by: PSYCHIATRY & NEUROLOGY

## 2022-04-11 PROCEDURE — 3017F COLORECTAL CA SCREEN DOC REV: CPT | Performed by: PSYCHIATRY & NEUROLOGY

## 2022-04-11 PROCEDURE — 1101F PT FALLS ASSESS-DOCD LE1/YR: CPT | Performed by: PSYCHIATRY & NEUROLOGY

## 2022-04-11 PROCEDURE — G8417 CALC BMI ABV UP PARAM F/U: HCPCS | Performed by: PSYCHIATRY & NEUROLOGY

## 2022-04-11 PROCEDURE — G8427 DOCREV CUR MEDS BY ELIG CLIN: HCPCS | Performed by: PSYCHIATRY & NEUROLOGY

## 2022-04-11 NOTE — LETTER
4/11/2022    Patient: Jillian Garcia   YOB: 1945   Date of Visit: 4/11/2022     Christelle Ramirez, [address] [place], [place] [phone]  Via Fax: [phone]    Dear Christelle Ramirez DO,      Thank you for referring Ms. Say Orta to [facility] for evaluation. My notes for this consultation are attached. If you have questions, please do not hesitate to call me. I look forward to following your patient along with you.       Sincerely,    Cait Haro MD

## 2022-04-11 NOTE — PATIENT INSTRUCTIONS
Learning About How to Prevent a Stroke  What is a stroke? A stroke is damage to the brain that occurs when a blood vessel in the brain bursts or is blocked by a blood clot. Without blood and the oxygen it carries, part of the brain starts to die. The part of the body controlled by the damaged area of the brain can't work properly. Brain damage can start within minutes of a stroke. But quick treatment can help limit the damage and increase the chance of a full recovery. What puts you at risk for stroke? A risk factor is anything that makes you more likely to have a particular health problem. Risk factors for stroke that you can manage or change include:  · Health problems like atrial fibrillation, diabetes, high blood pressure, high cholesterol, hardening of the arteries (atherosclerosis), and sickle cell disease. · Smoking. · Drinking more than 2 alcoholic drinks a day for men and 1 drink a day for women. · Being overweight. · Not eating healthy foods. · Not getting enough physical activity. Risk factors you can't change include:  · Having a previous stroke. · Family history of stroke. · Being older. · Being [place] American, [place] Native, Native [place], or [facility]. · Being female. · Having certain problems during pregnancy, such as preeclampsia. · Being past menopause. Your doctor can help you know your risk. Then you and your doctor can talk about whether to take steps to lower it. How can you help prevent a stroke? · Manage health problems that raise your risk. These include atrial fibrillation, diabetes, high blood pressure, and high cholesterol. · Have a heart-healthy lifestyle. ? Don't smoke. If you need help quitting, talk to your doctor about stop-smoking programs and medicines. These can increase your chances of quitting for good. ? Limit alcohol to 2 drinks a day for men and 1 drink a day for women. ? Stay at a healthy weight. Lose weight if you need to.  ?  Be active. Get at least 30 minutes of exercise on most days of the week. Walking is a good choice. You also may want to do other activities, such as running, swimming, cycling, or playing tennis or team sports. ? Eat heart-healthy foods. These include vegetables, fruits, nuts, beans, lean meat, fish, and whole grains. Limit sodium and sugar. ? If you think you may have a problem with alcohol or drug use, talk to your doctor. · If you use hormone therapy or hormonal birth control, talk with your doctor. Ask if these are right for you. They may raise the risk of stroke in some people. · Decide with your doctor whether you will also take medicines to help lower your risk. For example, you and your doctor may decide you will take a medicine that prevents blood clots. What are the BE FAST stroke warning signs? BE FAST is a simple way to remember the main symptoms of stroke. These symptoms happen suddenly. So knowing what to look for helps you know when to call for medical help. BE FAST stands for:  · B alance. Loss of balance or trouble walking. · E yes. Trouble seeing out of one or both eyes. · F ace. Weakness or drooping on one side of the face. · A rm. Weakness or numbness in an arm or leg. · S peech. Trouble speaking. · T jana to call 911. Other stroke symptoms include sudden confusion, sudden trouble understanding simple statements, fainting, a seizure, and a sudden, severe headache. What are the symptoms of a stroke? Symptoms of a stroke happen quickly. A stroke may cause:  · Sudden numbness, tingling, weakness, or loss of movement in your face, arm, or leg, especially on only one side of your body. · Sudden vision changes. · Sudden trouble speaking. · Sudden confusion or trouble understanding simple statements. · Sudden problems with walking or balance. · A sudden, severe headache that is different from past headaches. · Fainting. · A seizure.   It's important to call for medical help if you have stroke symptoms. Quick treatment may save your life. And it may reduce the damage in your brain so that you have fewer problems after the stroke. Follow-up care is a key part of your treatment and safety. Be sure to make and go to all appointments, and call your doctor if you are having problems. It's also a good idea to know your test results and keep a list of the medicines you take. Where can you learn more? Go to http://www.gray.com/  Enter G757 in the search box to learn more about \"Learning About How to Prevent a Stroke. \"  Current as of: July 6, 2021               Content Version: 13.2  © 8772-9524 [employer]. Care instructions adapted under license by [employer] (which disclaims liability or warranty for this information). If you have questions about a medical condition or this instruction, always ask your healthcare professional. [place] [address] any warranty or liability for your use of this information. Learning About How to Prevent Another Stroke  How can you help prevent another stroke? After a stroke, people feel lots of different emotions. Some people are worried that they could have another stroke. Or they may feel overwhelmed by how much there is to learn and do. Some people feel sad or depressed. No matter what emotions you are feeling, you can give yourself some control and peace of mind by following your plan to lower your risk of having another stroke. Take your medicines  You'll need to take medicines to help prevent another stroke. Be sure to take your medicines exactly as prescribed. And don't stop taking them unless your doctor tells you to. If you stop taking your medicines, you can increase your risk of having another stroke. Some of the medicines your doctor may prescribe include:  · Aspirin or some other blood thinner to prevent blood clots. · Statins and other medicines to lower cholesterol.   · Blood pressure medicines to lower blood pressure. Manage other health problems  You can help lower your chance of having another stroke by managing certain other health problems. Problems that increase your risk of having another stroke include:  · Atrial fibrillation. · Carotid artery disease. · Diabetes. · High blood pressure. · High cholesterol. If you have any of these health problems, you can manage them with a healthy lifestyle along with medicine. If you think you may have a problem with alcohol or drug use, talk to your doctor. This includes prescription medicines (such as amphetamines and opioids) and illegal drugs (such as cocaine and methamphetamine). Your doctor can help you figure out what type of treatment is best for you. Have a heart-healthy lifestyle  · Do not smoke or allow others to smoke around you. If you need help quitting, talk to your doctor about stop-smoking programs and medicines. These can increase your chances of quitting for good. Smoking makes a stroke more likely. · Limit alcohol to 2 drinks a day for men and 1 drink a day for women. · Stay at a healthy weight. Lose weight if you need to. Managing your weight will help you keep your heart and body healthy. · Be active. Ask your doctor what type and level of activity is safe for you. · Eat heart-healthy foods. These include vegetables, fruits, nuts, beans, lean meat, fish, and whole grains. Limit sodium and sugar. It's also important to:  · Get a flu shot every year. · Ask for help if you think you are depressed. Do stroke rehab  Taking part in a stroke rehabilitation (rehab) program also helps you take steps to prevent another stroke. Rehab can help you recover, prevent problems, and help you to regain skills you lost or make the most of your abilities after a stroke. Your rehab team will give you education and support to help you build new, healthy habits.  You'll learn how to manage any other health problems that you might have. Otoniel Freed also learn how to exercise safely, eat a healthy diet, and quit smoking if you smoke. Otoniel Freed work with your team to decide what lifestyle choices are best for you. If your doctor hasn't already suggested it, ask if stroke rehab is right for you. What are the BE FAST stroke warning signs? BE FAST is a simple way to remember the main symptoms of stroke. These symptoms happen suddenly. So knowing what to look for helps you know when to call for medical help. BE FAST stands for:  · B alance. Loss of balance or trouble walking. · E yes. Trouble seeing out of one or both eyes. · F ace. Weakness or drooping on one side of the face. · A rm. Weakness or numbness in an arm or leg. · S peech. Trouble speaking. · T jana to call 911. Other stroke symptoms include sudden confusion, sudden trouble understanding simple statements, fainting, a seizure, and a sudden, severe headache. Follow-up care is a key part of your treatment and safety. Be sure to make and go to all appointments, and call your doctor if you are having problems. It's also a good idea to know your test results and keep a list of the medicines you take. Where can you learn more? Go to http://www.gray.com/  Enter Q172 in the search box to learn more about \"Learning About How to Prevent Another Stroke. \"  Current as of: July 6, 2021               Content Version: 13.2  © 4550-4072 [employer]. Care instructions adapted under license by [employer] (which disclaims liability or warranty for this information). If you have questions about a medical condition or this instruction, always ask your healthcare professional. [place] [address] any warranty or liability for your use of this information.

## 2022-04-11 NOTE — PROGRESS NOTES
NEUROLOGY CLINIC NOTE    Patient ID:  Juan Barraza  [id number]  3 y.o.  1945    Date of Visit:  April 11, 2022    Reason for Visit:  Stroke    Chief Complaint   Patient presents with    Follow-up       History of Present Illness:     Patient Active Problem List    Diagnosis Date Noted    Syncope and collapse 12/14/2021    Left arm weakness 12/14/2021     Past Medical History:   Diagnosis Date    Glaucoma     History of mammography, screening 04/23/2019    Normal    Pap smear for cervical cancer screening 04/23/2019    Negative      Past Surgical History:   Procedure Laterality Date    HX ORTHOPAEDIC  1989    lower back surgery    HX ORTHOPAEDIC  2003    cervical neck surgery      Prior to Admission medications    Medication Sig Start Date End Date Taking? Authorizing Provider   atorvastatin (LIPITOR) 20 mg tablet Take 1 Tablet by mouth daily. 3/22/22  Yes Sally Hunter MD   ticagrelor (BRILINTA) 60 mg tab tablet Take 60 mg by mouth two (2) times a day. 2/22/22 5/23/22 Yes Provider, Historical   LUMIGAN 0.01 % ophthalmic drops INSTILL 1 DROP INTO BOTH EYES EVERY DAY AT NIGHT 2/27/19  Yes Provider, Historical   aspirin 81 mg chewable tablet Take 81 mg by mouth every seven (7) days. Yes Provider, Historical     Allergies   Allergen Reactions    Penicillins Anaphylaxis    Iodinated Contrast Media Shortness of Breath and Swelling      Social History     Tobacco Use    Smoking status: Never Smoker    Smokeless tobacco: Never Used   Substance Use Topics    Alcohol use: Never      Family History   Problem Relation Age of Onset    Heart Disease Mother     Heart Disease Father         Subjective:      Juan Bararza is a 78 y.o. WF with a history of stroke, atherosclerotic cerebrovascular disease, hyperlipidemia and glaucoma who is here for further evaluation after a recent stroke. Patient is here for follow up.     Patient was admitted at [facility] last 12/14/2021 for intermittent episodes of dizziness, nausea and near syncopal spells for more than 1 week.   Patient complains of dizziness, nausea and near syncopal episodes since 12/4/2021. Patient has been seen in 4 different ERs the last week and wants answers. She was told likely secondary to vertigo and UTI. Taking medications for vertigo with no relief. Patient after receiving contrast dye at the CT scan developed lethargy was clammy, diaphoretic with slurred speech. Possible allergic reaction to contrast.  Solu-Medrol and Benadryl was administered. Patient also given epi intramuscularly. Patient is mentions her PCP having difficulty obtaining blood pressure on the left arm and difficulty palpating a pulse. Patient was sent to the ER for further evaluation.     In the ER blood pressure was 113/71. Labs done revealed unremarkable urinalysis, troponin, lipid panel (LDL 92.2), respiratory virus panel with COVID-19 PCR and hemoglobin A1c. CBC showing increased WBC at 12.2 and increased platelet at 039. CMP showing increased glucose at 110 and increased calcium at 10.2. Elevated TSH at 5. Head and neck CTA with contrast revealed bilateral vertebral arteries are diminutive in caliber with severe atherosclerotic changes. Long segment occlusion in the right cervical vertebral artery. Underlying dissection is not excluded. Intracranial V4 segments are patent at the level of PICA. Moderate to severe atherosclerotic changes in the cavernous internal carotid artery. Moderate atherosclerosis in the left posterior cerebral artery. A few biapical lung nodules measuring up to 4 mm with patchy groundglass opacities. Head CT without contrast did not reveal any acute process. Periventricular white matter disease.     Review of records revealed:  Patient was seen at [facility] cardiology 12/10/21  Started having episodic dizziness recently. Has R eye blurriness and been to eye center earlier this week; rec for cataract extraction.  Stays dizzy all the time but when it gets worse she gets nausea. Fine went to bed last night. When lies down long time she gets dizzy. Got up 3 times to go bathroom last night and felt fine but when got up this AM she was staggering and felt dizzy unable to stand. Got in shower and got dizzy. It passed spontaneously. Riding in the car is worse with nausea. Worse tilting her head back. Had vertigo years before and took dramaine with good relief. This feels similarly but the blurriness is new. No chest pain. Had cervical spine surgery in the past and has L arm numbness. She has posterior neck pain. Last few years the pain in her neck has gradually worsened. Rare headaches. Can no longer get BP in L arm due to lack of arm pulses. Sleeps a lot and is fatigued. This is unlike her. Taking dramamine and may contribute. Here with daughter who provider collateral history. Spoke to PCP about her condition prior to visit. Patient seen in PCP clinic few days prior after ED visits. (both local and ED visits) Labs reassuring. CT brain without ICP. CT chest without PE. She was orthostatic in the office and passed out upon standing. This prompted local ED visit. BMP normal. covid negative.   Echo EF >55% moderate LVH     Patient also seen at [facility] in [facility] and had negative head CT and positive urinalysis for UTI and was discharged on antibiotics.     When seen, patient was still complaining of feeling that her tongue was swollen as well as her lips and some tightness in her throat from the episode last night. Mild slurring of her speech. The next day, patient still complaining of feelings of lightheadedness and still some unsteadiness. Speech is still slightly slurred. Carotid Doppler study did not reveal any significant stenosis. Brain MRI without contrast revealed small acute infarcts bilateral cerebellar hemisphere and small acute infarct right occipital lobe.   Labs done revealed increased WBC 15.2, increased platelet at 418, increased magnesium at 2.5 and unremarkable CMP. Patient was discharged on 12/15/2021 on Plavix 75 mg daily, atorvastatin 20 mg daily and aspirin 81 mg daily. Patient had aspirin and platelet function lab testing done 2/1/2022 and both were efficacious. I call the patient and informed her of the lab results and to continue Aspirin and Plavix. Review of records revealed patient was seen by Dr. Sebastian Prader at the [facility] stroke clinic last 1/27/2022. Note mentions informing patient of continuing dual antiplatelet therapy for 90 days and then a decision has to be made with regards to single agent afterwards. Note also mentions about loop recorder placement. Then apparently last 2/21/2022, patient had an episode at home where she could not say what she wanted to say and was talking nonsensically. No slurring of speech. She texted her daughter and it was nonsensical.  It lasted for about 1 hour. Son-in-law called EMS and patient was transported to [facility] at [facility] and then [facility] to [facility] in [facility]. Labs done revealed unremarkable urinalysis, COVID-19 testing, troponin, CMP, lipid panel (LDL 37), PT, PTT, INR and drug screen. EKG done revealed normal sinus rhythm. Chest x-ray revealed fusion hardware noted over the lower spine but otherwise no acute process. Head CT without contrast did not reveal any acute intracranial abnormality. Also thickening of the paranasal sinuses suggest presence of underlying sinusitis. Carotid ultrasound done revealed minimal bilateral carotid artery occlusive disease. Occlusion of the right vertebral artery and moderate stenosis of the left vertebral artery. MRA of the head revealed long segment moderate to severe stenosis left posterior cerebral artery, short segment critical stenosis of proximal left cavernous carotid artery. Severe stenosis distal left cavernous carotid artery.   Short segment severe stenosis proximal right cavernous carotid artery. Brain MRI done revealed restricted diffusion right occipital lobe, left posterior corpus callosum, left frontal lobe white matter. Periventricular and punctate white matter changes probably secondary to small vessel ischemic disease. Findings consistent with multifocal bilateral cerebral subacute infarcts. Transthoracic echocardiogram done revealed EF of 60 to 65%. No thrombus. Neuro interventional service was consulted and no intervention was recommended. Plavix was discontinued and patient was started on Brilinta. Aspirin was continued. 30-day event monitor was ordered. Advised to follow-up with neurology. Patient was discharged 2/22/2022. Since the last visit on 3/2/2022, patient reports no recurrence of problems with saying what she wants to say or talking or texting nonsensically. No significant issues with dizziness or vertigo. Walking continues to be better. No falls. Chronic problems with blurring of vision. Patient notes issues with shortness of breath and wondering if secondary to Brilinta. It is not every time. More so with increased physical activity. Less issues with daytime sleepiness. Reports episodes of waking up in the morning with soreness in both calf muscles. Better with walking and throughout the day. She is compliant with her aspirin 81 mg daily and Brilinta twice a day. She is also taking atorvastatin 20 mg daily. Patient was seen by Dr. Stefanie Kinney (sleep specialist) and is supposed to have an in-house polysomnogram testing done which she is trying to coordinate with her daughter. Wary about having it done in the facility did not in the hospital due to previous stroke.     Outside reports reviewed: office note    Review of Systems:    A comprehensive review of systems was performed:   Constitutional: positive for none  Eyes: positive for vision issues  Ears, nose, mouth, throat, and face: positive for none  Respiratory: positive for none  Cardiovascular: positive for none  Gastrointestinal: positive for none  Genitourinary: positive for none  Integument/breast: positive for none  Hematologic/lymphatic: positive for none  Musculoskeletal: positive for muscle pain  Neurological: positive for none  Behavioral/Psych: positive for none  Endocrine: positive for none  Allergic/Immunologic: positive for none      Objective:     Visit Vitals  /68 (BP 1 Location: Left arm, BP Patient Position: Sitting, BP Cuff Size: Adult)   Resp 20       PHYSICAL EXAM:    NEUROLOGICAL EXAM:    Appearance: The patient is obese, provides a coherent history and is in no acute distress. Mental Status: Oriented to time, place and person. Fluent, no aphasia or dysarthria. Mood and affect appropriate. Cranial Nerves:   Intact visual fields. SANA, EOM's full, no nystagmus, no ptosis. Facial sensation is normal. Corneal reflexes are intact. Facial movement is symmetric. Decrease hearing bilaterally. Palate is midline with normal elevation. Sternocleidomastoid and trapezius muscles are normal. Tongue is midline. Motor:  5/5 strength in upper and lower proximal and distal muscles. Normal bulk and tone. No fasciculations. No pronator drift. Reflexes:   Deep tendon reflexes 1+/4 and symmetrical. Downgoing toes. Sensory:   Normal to cold, pinprick and vibration. Gait:  Normal gait. No Romberg but with some truncal instability. Problems with tandem walking. Tremor:   No tremor noted. Cerebellar:  Intact FTN/ADRIANA/HTS. 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 No residual cognitive deficits from recent stroke. Brain MRI done revealed restricted diffusion right occipital lobe, left posterior corpus callosum, left frontal lobe white matter.   Periventricular and punctate white matter changes probably secondary to small vessel ischemic disease. Findings consistent with multifocal bilateral cerebral subacute infarcts. Patient is also status post posterior right occipital and bilateral cerebellar infarcts. Head CT without contrast did not reveal any acute process. Periventricular white matter disease. Previous brain MRI without contrast revealed small acute infarct right greater than left cerebellar hemisphere and small acute infarct right occipital lobe. Repeat echocardiogram at [facility] was unremarkable. Prior echocardiogram at [facility] did not reveal any PFO. Repeat carotid Doppler studies did not reveal any significant ICA stenosis. Patient again advised to see her ophthalmologist for the vision issues and to also have Abbott's visual field testing done to assess for any visual field defect. Advised to do routine structured physical exercises. Call 911 if new deficits occur. Continue aspirin 81 mg daily and Brilinta 60 mg BID (total of 90 days) for stroke prophylaxis. Aspirin on previous check was efficacious. Call 911 if new deficits occur. Recent MRA of the head revealed long segment moderate to severe stenosis left posterior cerebral artery, short segment critical stenosis of proximal left cavernous carotid artery. Severe stenosis distal left cavernous carotid artery. Short segment severe stenosis proximal right cavernous carotid artery. Based on most recent MRA there seems to be some improvement as there was no right vertebral artery occlusion seen. Previous head and neck CTA revealed bilateral vertebral arteries that are diminutive in caliber with severe atherosclerotic changes. Long segment occlusion in the right cervical vertebral artery. Moderate to severe atherosclerotic changes in the cavernous internal carotid artery. Moderate atherosclerosis in the left posterior cerebral artery. Continue Aspirin and Brilinta.   Then after 90 days mainly aspirin 81 mg daily. Last LDL at U was at goal (<70). Advised compliance with dietary modifications and medications for hyperlipidemia. Continue atorvastatin 20 mg daily. Previously provided with DASH diet. Recommend trial of lowering the dose to 10 mg daily and recheck afterwards given issues with calf pain. This can be done care of her PCP. Patient having issues with sleep. Need to look for sleep disorder especially obstructive sleep apnea which is a stroke risk. Question of RLS given waking up with calf pain in the morning. Patient has been seen by sleep specialist and is set for in-house polysomnogram.  Patient was given the option of having the sleep study done at [facility] to give her the peace of mind that it is in the hospital.  All questions and concerns were answered. Visit lasted 45 minutes. Greater than 50% was spent discussing her condition, etiology, prognosis, stroke risk and prevention, routine structured physical exercises, fall precautions, strict compliance with dietary modifications and medications for hyperlipidemia, polysomnogram testing care of sleep medicine, treatment options, medication    This note was created using voice recognition software. Despite editing, there may be syntax errors.

## 2022-04-27 ENCOUNTER — TELEPHONE (OUTPATIENT)
Dept: NEUROLOGY | Age: 77
End: 2022-04-27

## 2022-04-29 NOTE — TELEPHONE ENCOUNTER
PT called and was concerned that when she stops taking the Brilinta that it will not be enough and she will have another stroke. She was questioning why would the Aspirin after that 90-days would it be enough by itself? She also stated she has been having issues with her legs bothering her mostly at night. They do not bother her during the daytime as long as she is moving and on the go. When she goes to bed and after she has been off of them for a little while they feel weak and she has to use her arms to lift them to move them.    Please advise

## 2022-05-03 NOTE — TELEPHONE ENCOUNTER
Returned her call. Stated she has been doing exercises which seems to be helping with her leg she wants to see if that will continue to improve before doing testing. She did have another question about the Brilinta was are the elevated risks to continuing to take it past the 90-days?   Also she wanted to see if there will some day be a test to check and see if its working like Coumadin, Plavix and Aspirin test?  Please advise

## 2022-08-30 ENCOUNTER — OFFICE VISIT (OUTPATIENT)
Dept: NEUROLOGY | Age: 77
End: 2022-08-30
Payer: MEDICARE

## 2022-08-30 VITALS
RESPIRATION RATE: 16 BRPM | BODY MASS INDEX: 25.06 KG/M2 | WEIGHT: 136.2 LBS | OXYGEN SATURATION: 97 % | HEART RATE: 74 BPM | DIASTOLIC BLOOD PRESSURE: 70 MMHG | HEIGHT: 62 IN | SYSTOLIC BLOOD PRESSURE: 120 MMHG

## 2022-08-30 DIAGNOSIS — Z86.73 HISTORY OF CVA (CEREBROVASCULAR ACCIDENT): Primary | ICD-10-CM

## 2022-08-30 DIAGNOSIS — I67.2 ATHEROSCLEROTIC CEREBROVASCULAR DISEASE: ICD-10-CM

## 2022-08-30 DIAGNOSIS — G47.12 IDIOPATHIC HYPERSOMNIA WITHOUT LONG SLEEP TIME: ICD-10-CM

## 2022-08-30 DIAGNOSIS — E78.2 MIXED HYPERLIPIDEMIA: ICD-10-CM

## 2022-08-30 PROCEDURE — G8536 NO DOC ELDER MAL SCRN: HCPCS | Performed by: PSYCHIATRY & NEUROLOGY

## 2022-08-30 PROCEDURE — G8510 SCR DEP NEG, NO PLAN REQD: HCPCS | Performed by: PSYCHIATRY & NEUROLOGY

## 2022-08-30 PROCEDURE — 1123F ACP DISCUSS/DSCN MKR DOCD: CPT | Performed by: PSYCHIATRY & NEUROLOGY

## 2022-08-30 PROCEDURE — 3017F COLORECTAL CA SCREEN DOC REV: CPT | Performed by: PSYCHIATRY & NEUROLOGY

## 2022-08-30 PROCEDURE — 1101F PT FALLS ASSESS-DOCD LE1/YR: CPT | Performed by: PSYCHIATRY & NEUROLOGY

## 2022-08-30 PROCEDURE — G8427 DOCREV CUR MEDS BY ELIG CLIN: HCPCS | Performed by: PSYCHIATRY & NEUROLOGY

## 2022-08-30 PROCEDURE — G8400 PT W/DXA NO RESULTS DOC: HCPCS | Performed by: PSYCHIATRY & NEUROLOGY

## 2022-08-30 PROCEDURE — 99214 OFFICE O/P EST MOD 30 MIN: CPT | Performed by: PSYCHIATRY & NEUROLOGY

## 2022-08-30 PROCEDURE — G8420 CALC BMI NORM PARAMETERS: HCPCS | Performed by: PSYCHIATRY & NEUROLOGY

## 2022-08-30 PROCEDURE — 1090F PRES/ABSN URINE INCON ASSESS: CPT | Performed by: PSYCHIATRY & NEUROLOGY

## 2022-08-30 NOTE — PROGRESS NOTES
Chief Complaint   Patient presents with    Cerebrovascular Accident     Follow up         Patient stated she has been having issues with her legs (mainly her joints) bothering her mostly at night. Patient reports first thing in the morning her legs feel weak when she tries to get up but once she gets up and moving feels a little better but still has the joint pain but not as bad.       Visit Vitals  /70   Pulse 74   Resp 16   Ht 5' 2\" (1.575 m)   Wt 61.8 kg (136 lb 3.2 oz)   SpO2 97%   BMI 24.91 kg/m²

## 2022-08-30 NOTE — LETTER
8/30/2022    Patient: Jamey Bolanos   YOB: 1945   Date of Visit: 8/30/2022     Mary Liang, [address] [place], [place] [id number]  Via Fax: [phone]    Dear Mary Liang DO,      Thank you for referring Ms. Leeann Olivera to [facility] for evaluation. My notes for this consultation are attached. If you have questions, please do not hesitate to call me. I look forward to following your patient along with you.       Sincerely,    Lola Loza MD

## 2022-08-30 NOTE — PROGRESS NOTES
NEUROLOGY CLINIC NOTE    Patient ID:  Alfredo Barcenas  [id number]  4 y.o.  1945    Date of Visit:  August 30, 2022    Reason for Visit:  Stroke    Chief Complaint   Patient presents with    Cerebrovascular Accident     Follow up       History of Present Illness:     Patient Active Problem List    Diagnosis Date Noted    Syncope and collapse 12/14/2021    Left arm weakness 12/14/2021     Past Medical History:   Diagnosis Date    Glaucoma     History of mammography, screening 04/23/2019    Normal    Pap smear for cervical cancer screening 04/23/2019    Negative      Past Surgical History:   Procedure Laterality Date    HX ORTHOPAEDIC  1989    lower back surgery    HX ORTHOPAEDIC  2003    cervical neck surgery      Prior to Admission medications    Medication Sig Start Date End Date Taking? Authorizing Provider   ticagrelor (BRILINTA) 60 mg tab tablet Take 60 mg by mouth two (2) times a day. 6/24/22  Yes Provider, Historical   atorvastatin (LIPITOR) 20 mg tablet Take 1 Tablet by mouth daily. 3/22/22  Yes MD JANIE Mejia 0.01 % ophthalmic drops INSTILL 1 DROP INTO BOTH EYES EVERY DAY AT NIGHT 2/27/19  Yes Provider, Historical   aspirin 81 mg chewable tablet Take 81 mg by mouth daily. Yes Provider, Historical     Allergies   Allergen Reactions    Penicillins Anaphylaxis    Iodinated Contrast Media Shortness of Breath and Swelling      Social History     Tobacco Use    Smoking status: Never    Smokeless tobacco: Never   Substance Use Topics    Alcohol use: Never      Family History   Problem Relation Age of Onset    Heart Disease Mother     Heart Disease Father         Subjective:      Alfredo Barcenas is a 78 y.o. WF with a history of stroke, atherosclerotic cerebrovascular disease, hyperlipidemia and glaucoma who is here for further evaluation after a recent stroke. Patient is here for follow up.     Patient was admitted at [facility] last 12/14/2021 for intermittent episodes of dizziness, nausea and near syncopal spells for more than 1 week. Patient complains of dizziness, nausea and near syncopal episodes since 12/4/2021. Patient has been seen in 4 different ERs the last week and wants answers. She was told likely secondary to vertigo and UTI. Taking medications for vertigo with no relief. Patient after receiving contrast dye at the CT scan developed lethargy was clammy, diaphoretic with slurred speech. Possible allergic reaction to contrast.  Solu-Medrol and Benadryl was administered. Patient also given epi intramuscularly. Patient is mentions her PCP having difficulty obtaining blood pressure on the left arm and difficulty palpating a pulse. Patient was sent to the ER for further evaluation. In the ER blood pressure was 113/71. Labs done revealed unremarkable urinalysis, troponin, lipid panel (LDL 92.2), respiratory virus panel with COVID-19 PCR and hemoglobin A1c. CBC showing increased WBC at 12.2 and increased platelet at 241. CMP showing increased glucose at 110 and increased calcium at 10.2. Elevated TSH at 5. Head and neck CTA with contrast revealed bilateral vertebral arteries are diminutive in caliber with severe atherosclerotic changes. Long segment occlusion in the right cervical vertebral artery. Underlying dissection is not excluded. Intracranial V4 segments are patent at the level of PICA. Moderate to severe atherosclerotic changes in the cavernous internal carotid artery. Moderate atherosclerosis in the left posterior cerebral artery. A few biapical lung nodules measuring up to 4 mm with patchy groundglass opacities. Head CT without contrast did not reveal any acute process. Periventricular white matter disease. Review of records revealed:  Patient was seen at [facility] cardiology 12/10/21  Started having episodic dizziness recently. Has R eye blurriness and been to eye center earlier this week; rec for cataract extraction.  Stays dizzy all the time but when it gets worse she gets nausea. Fine went to bed last night. When lies down long time she gets dizzy. Got up 3 times to go bathroom last night and felt fine but when got up this AM she was staggering and felt dizzy unable to stand. Got in shower and got dizzy. It passed spontaneously. Riding in the car is worse with nausea. Worse tilting her head back. Had vertigo years before and took dramaine with good relief. This feels similarly but the blurriness is new. No chest pain. Had cervical spine surgery in the past and has L arm numbness. She has posterior neck pain. Last few years the pain in her neck has gradually worsened. Rare headaches. Can no longer get BP in L arm due to lack of arm pulses. Sleeps a lot and is fatigued. This is unlike her. Taking dramamine and may contribute. Here with daughter who provider collateral history. Spoke to PCP about her condition prior to visit. Patient seen in PCP clinic few days prior after ED visits. (both local and ED visits) Labs reassuring. CT brain without ICP. CT chest without PE. She was orthostatic in the office and passed out upon standing. This prompted local ED visit. BMP normal. covid negative. Echo EF >55% moderate LVH     Patient also seen at [facility] in [place] and had negative head CT and positive urinalysis for UTI and was discharged on antibiotics. When seen, patient was still complaining of feeling that her tongue was swollen as well as her lips and some tightness in her throat from the episode last night. Mild slurring of her speech. The next day, patient still complaining of feelings of lightheadedness and still some unsteadiness. Speech is still slightly slurred. Carotid Doppler study did not reveal any significant stenosis. Brain MRI without contrast revealed small acute infarcts bilateral cerebellar hemisphere and small acute infarct right occipital lobe.   Labs done revealed increased WBC 15.2, increased platelet at 563, increased magnesium at 2.5 and unremarkable CMP. Patient was discharged on 12/15/2021 on Plavix 75 mg daily, atorvastatin 20 mg daily and aspirin 81 mg daily. Patient had aspirin and platelet function lab testing done 2/1/2022 and both were efficacious. I call the patient and informed her of the lab results and to continue Aspirin and Plavix. Review of records revealed patient was seen by Dr. Jocelyne Quinonez at the [address] [facility] stroke clinic last 1/27/2022. Note mentions informing patient of continuing dual antiplatelet therapy for 90 days and then a decision has to be made with regards to single agent afterwards. Note also mentions about loop recorder placement. Then apparently last 2/21/2022, patient had an episode at home where she could not say what she wanted to say and was talking nonsensically. No slurring of speech. She texted her daughter and it was nonsensical.  It lasted for about 1 hour. Son-in-law called EMS and patient was transported to [facility] [place] and then [facility] to [facility] in [place]. Labs done revealed unremarkable urinalysis, COVID-19 testing, troponin, CMP, lipid panel (LDL 37), PT, PTT, INR and drug screen. EKG done revealed normal sinus rhythm. Chest x-ray revealed fusion hardware noted over the lower spine but otherwise no acute process. Head CT without contrast did not reveal any acute intracranial abnormality. Also thickening of the paranasal sinuses suggest presence of underlying sinusitis. Carotid ultrasound done revealed minimal bilateral carotid artery occlusive disease. Occlusion of the right vertebral artery and moderate stenosis of the left vertebral artery. MRA of the head revealed long segment moderate to severe stenosis left posterior cerebral artery, short segment critical stenosis of proximal left cavernous carotid artery. Severe stenosis distal left cavernous carotid artery. Short segment severe stenosis proximal right cavernous carotid artery. Brain MRI done revealed restricted diffusion right occipital lobe, left posterior corpus callosum, left frontal lobe white matter. Periventricular and punctate white matter changes probably secondary to small vessel ischemic disease. Findings consistent with multifocal bilateral cerebral subacute infarcts. Transthoracic echocardiogram done revealed EF of 60 to 65%. No thrombus. Neuro interventional service was consulted and no intervention was recommended. Plavix was discontinued and patient was started on Brilinta. Aspirin was continued. 30-day event monitor was ordered. Advised to follow-up with neurology. Patient was discharged 2/22/2022. Since the last visit on 4/11/2022, patient reports no recurrence of problems with saying what she wants to say or talking or texting nonsensically. No significant issues with dizziness or vertigo. Walking continues to be better. No falls. Chronic problems with blurring of vision. Less issues with daytime sleepiness. Still having issues with leg joint pains at night and when she wakes up in the morning her legs feel weak. Better with movements. She is compliant with her aspirin 81 mg daily and Brilinta twice a day. She is also taking atorvastatin 20 mg daily. She drives less than 9 miles. Only local and daytime driving. No issues. Review of records revealed:  Labs done by PCP last 6/2022 showed unremarkable CBC and CMP except for slightly low sodium at 133. CPK was normal at 112. Lipid panel was normal but overall low. LDL was only 23. Patient was seen at [address] [place] [address] neurology for follow-up from her stroke last 6/24/2022. Note mentions -30-day event monitoring discussion about paroxysmal atrial fibrillation and potential need for loop recorder. Deferred evaluation for loop recorder implantation. Discussion about discontinuing Brilinta but since patient is doing well, plan was to continue it.   Advised switching to rosuvastatin 10 mg daily from atorvastatin 20 mg daily given muscle and joint issues. Outside reports reviewed: office note, labs    Review of Systems:    A comprehensive review of systems was performed:   Constitutional: positive for none  Eyes: positive for vision issues  Ears, nose, mouth, throat, and face: positive for none  Respiratory: positive for none  Cardiovascular: positive for none  Gastrointestinal: positive for none  Genitourinary: positive for none  Integument/breast: positive for none  Hematologic/lymphatic: positive for none  Musculoskeletal: positive for muscle pain  Neurological: positive for none  Behavioral/Psych: positive for none  Endocrine: positive for none  Allergic/Immunologic: positive for none      Objective:     Visit Vitals  /70   Pulse 74   Resp 16   Ht 5' 2\" (1.575 m)   Wt 61.8 kg (136 lb 3.2 oz)   SpO2 97%   BMI 24.91 kg/m²       PHYSICAL EXAM:    NEUROLOGICAL EXAM:    Appearance: The patient is obese, provides a coherent history and is in no acute distress. Mental Status: Oriented to time, place and person. Fluent, no aphasia or dysarthria. Mood and affect appropriate. Cranial Nerves:   Intact visual fields. SANA, EOM's full, no nystagmus, no ptosis. Facial sensation is normal. Corneal reflexes are intact. Facial movement is symmetric. Decrease hearing bilaterally. Palate is midline with normal elevation. Sternocleidomastoid and trapezius muscles are normal. Tongue is midline. Motor:  5/5 strength in upper and lower proximal and distal muscles. Normal bulk and tone. No fasciculations. No pronator drift. Reflexes:   Deep tendon reflexes 1+/4 and symmetrical. Downgoing toes. Sensory:   Normal to cold, pinprick and vibration. Gait:  Normal gait. No Romberg but with some truncal instability. Problems with tandem walking. Tremor:   No tremor noted. Cerebellar:  Intact FTN/ADRIANA/HTS.        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 No residual cognitive deficits from recent stroke. Brain MRI done revealed restricted diffusion right occipital lobe, left posterior corpus callosum, left frontal lobe white matter. Periventricular and punctate white matter changes probably secondary to small vessel ischemic disease. Findings consistent with multifocal bilateral cerebral subacute infarcts. Patient is also status post posterior right occipital and bilateral cerebellar infarcts. Head CT without contrast did not reveal any acute process. Periventricular white matter disease. Previous brain MRI without contrast revealed small acute infarct right greater than left cerebellar hemisphere and small acute infarct right occipital lobe. Repeat echocardiogram at [facility] was unremarkable. Prior echocardiogram at [facility] did not reveal any PFO. Repeat carotid Doppler studies did not reveal any significant ICA stenosis. Patient again advised to see her ophthalmologist for the vision issues and to also have [employer]'s visual field testing done to assess for any visual field defect. Advised to do routine structured physical exercises. Call 911 if new deficits occur. Continue aspirin 81 mg daily and Brilinta 60 mg BID (total of 90 days) for stroke prophylaxis. Aspirin on previous check was efficacious. Call 911 if new deficits occur. Recent MRA of the head revealed long segment moderate to severe stenosis left posterior cerebral artery, short segment critical stenosis of proximal left cavernous carotid artery. Severe stenosis distal left cavernous carotid artery. Short segment severe stenosis proximal right cavernous carotid artery. Based on most recent MRA there seems to be some improvement as there was no right vertebral artery occlusion seen.   Previous head and neck CTA revealed bilateral vertebral arteries that are diminutive in caliber with severe atherosclerotic changes. Long segment occlusion in the right cervical vertebral artery. Moderate to severe atherosclerotic changes in the cavernous internal carotid artery. Moderate atherosclerosis in the left posterior cerebral artery. Continue Aspirin and Brilinta. Patient wary about coming off of [address]. Last LDL was at goal (<70) but very low. Given issues with muscle and joint pain despite normal CPK and LFT's, patient was advised to lower dose of atorvastatin to 10 mg daily instead of 20 mg. Advised compliance with dietary modifications and medications for hyperlipidemia. Previously provided with DASH diet. Patient having issues with sleep. Need to look for sleep disorder especially obstructive sleep apnea which is a stroke risk. Question of RLS given waking up with calf pain in the morning. Patient has been seen by sleep specialist. Referral again made and if patient is eligible for HSAT, she would prefer that. All questions and concerns were answered. This note was created using voice recognition software. Despite editing, there may be syntax errors.

## 2022-09-15 ENCOUNTER — OFFICE VISIT (OUTPATIENT)
Dept: SLEEP MEDICINE | Age: 77
End: 2022-09-15
Payer: MEDICARE

## 2022-09-15 VITALS
WEIGHT: 139.7 LBS | OXYGEN SATURATION: 98 % | DIASTOLIC BLOOD PRESSURE: 74 MMHG | SYSTOLIC BLOOD PRESSURE: 110 MMHG | HEART RATE: 71 BPM | BODY MASS INDEX: 25.71 KG/M2 | HEIGHT: 62 IN

## 2022-09-15 DIAGNOSIS — G47.33 OSA (OBSTRUCTIVE SLEEP APNEA): Primary | ICD-10-CM

## 2022-09-15 PROCEDURE — G8536 NO DOC ELDER MAL SCRN: HCPCS | Performed by: SPECIALIST

## 2022-09-15 PROCEDURE — 3017F COLORECTAL CA SCREEN DOC REV: CPT | Performed by: SPECIALIST

## 2022-09-15 PROCEDURE — 1101F PT FALLS ASSESS-DOCD LE1/YR: CPT | Performed by: SPECIALIST

## 2022-09-15 PROCEDURE — 1090F PRES/ABSN URINE INCON ASSESS: CPT | Performed by: SPECIALIST

## 2022-09-15 PROCEDURE — G8417 CALC BMI ABV UP PARAM F/U: HCPCS | Performed by: SPECIALIST

## 2022-09-15 PROCEDURE — 1123F ACP DISCUSS/DSCN MKR DOCD: CPT | Performed by: SPECIALIST

## 2022-09-15 PROCEDURE — G8432 DEP SCR NOT DOC, RNG: HCPCS | Performed by: SPECIALIST

## 2022-09-15 PROCEDURE — G8427 DOCREV CUR MEDS BY ELIG CLIN: HCPCS | Performed by: SPECIALIST

## 2022-09-15 PROCEDURE — 99213 OFFICE O/P EST LOW 20 MIN: CPT | Performed by: SPECIALIST

## 2022-09-15 PROCEDURE — G8400 PT W/DXA NO RESULTS DOC: HCPCS | Performed by: SPECIALIST

## 2022-09-15 NOTE — PROGRESS NOTES
[address] [facility] [place]. [place], [address]  Tel.  [phone]  Fax. [address] [facility] [address]  [place], [address] [facility]  Tel.  [phone]  Fax. [phone] [address] [facility] Pavel Co[place]   Tel.  [phone]  Fax. [phone]         Chief Complaint       Chief Complaint   Patient presents with    Sleep Problem     Follow up         HPI        Jesus Foster is a 78 y.o. female seen for follow-up. Patient accompanied by her daughter who assists with history. Has been told of snoring of varying intensity. Notes daytime fatigue worse following CVA. Normally retires between 8-9 PM and will get a bed at 7 AM.  May awaken several times during the night. Nap several times during the day. Sleep study suggested. Patient returns requesting a home study. Allergies   Allergen Reactions    Penicillins Anaphylaxis    Iodinated Contrast Media Shortness of Breath and Swelling       Current Outpatient Medications   Medication Sig Dispense Refill    ticagrelor (BRILINTA) 60 mg tab tablet Take 1 Tablet by mouth two (2) times a day. 60 Tablet 5    atorvastatin (LIPITOR) 20 mg tablet Take 1 Tablet by mouth daily. 90 Tablet 3    aspirin 81 mg chewable tablet Take 81 mg by mouth daily. LUMIGAN 0.01 % ophthalmic drops INSTILL 1 DROP INTO BOTH EYES EVERY DAY AT NIGHT  12        She  has a past medical history of Glaucoma, History of mammography, screening (04/23/2019), Pap smear for cervical cancer screening (04/23/2019), and Stroke [facility]). She  has a past surgical history that includes hx orthopaedic (1989) and hx orthopaedic (2003). She family history includes Heart Disease in her father and mother. She  reports that she has never smoked. She has never used smokeless tobacco. She reports that she does not drink alcohol and does not use drugs.      Review of Systems:  Unchanged per patient      Objective:   Visit Vitals  /74 (BP 1 Location: Left upper arm, BP Patient Position: Sitting)   Pulse 71   Ht 5' 2\" (1.575 m)   Wt 139 lb 11.2 oz (63.4 kg)   SpO2 98%   BMI 25.55 kg/m²     Body mass index is 25.55 kg/m². General:    cooperative   Eyes:  Pupils equal and reactive, no nystagmus   Oropharynx:   Mallampati score III, tongue normal, narrow posterior oral airway                CVS:  Normal rate, regular rhythm                     Assessment:       ICD-10-CM ICD-9-CM    1. BHANU (obstructive sleep apnea)  G47.33 327.23         Patient requests evaluation with a home sleep test.  Patient and her daughter advised that an attended study would be indicated if the home test is nondiagnostic. Plan:   No orders of the defined types were placed in this encounter. * Patient has a history and examination consistent with the diagnosis of sleep apnea. *Home sleep testing was ordered for initial evaluation. * She was provided information on sleep apnea including corresponding risk factors and the importance of proper treatment. * Treatment options if indicated were reviewed today. Lara Mccloud MD, FAA  Electronically signed 09/15/22        This note was created using voice recognition software. Despite editing, there may be syntax errors. This note will not be viewable in [address]

## 2022-09-22 ENCOUNTER — HOSPITAL ENCOUNTER (OUTPATIENT)
Dept: SLEEP MEDICINE | Age: 77
Discharge: HOME | End: 2022-09-22
Payer: MEDICARE

## 2022-09-22 PROCEDURE — 95806 SLEEP STUDY UNATT&RESP EFFT: CPT | Performed by: SPECIALIST

## 2022-09-27 ENCOUNTER — TELEPHONE (OUTPATIENT)
Dept: SLEEP MEDICINE | Age: 77
End: 2022-09-27

## 2022-09-27 DIAGNOSIS — G47.33 OSA (OBSTRUCTIVE SLEEP APNEA): Primary | ICD-10-CM

## 2022-09-28 NOTE — TELEPHONE ENCOUNTER
HSAT performed for potential sleep disordered breathing. Overall AHI 4.6/h, minimal SaO2 88%. Snoring during 0.2%. Impression: HSAT did not demonstrate significant sleep disordered breathing. Study potentially underestimated sleep apnea severity. Recommendation: Attended PSG    Sleep technologist: Please advise patient of HSAT results. Order has been entered for attended PSG.

## 2022-09-29 NOTE — TELEPHONE ENCOUNTER
Reviewed sleep study results with patient. She expressed understanding. She does not wish to do PSG at this time wants to call when she is ready.

## 2023-02-28 ENCOUNTER — OFFICE VISIT (OUTPATIENT)
Dept: NEUROLOGY | Age: 78
End: 2023-02-28
Payer: MEDICARE

## 2023-02-28 VITALS
RESPIRATION RATE: 15 BRPM | HEART RATE: 71 BPM | WEIGHT: 148 LBS | OXYGEN SATURATION: 98 % | DIASTOLIC BLOOD PRESSURE: 80 MMHG | BODY MASS INDEX: 27.23 KG/M2 | SYSTOLIC BLOOD PRESSURE: 110 MMHG | HEIGHT: 62 IN

## 2023-02-28 DIAGNOSIS — G47.12 IDIOPATHIC HYPERSOMNIA WITHOUT LONG SLEEP TIME: ICD-10-CM

## 2023-02-28 DIAGNOSIS — I63.9 CEREBELLAR INFARCTION (HCC): Primary | ICD-10-CM

## 2023-02-28 DIAGNOSIS — E78.2 MIXED HYPERLIPIDEMIA: ICD-10-CM

## 2023-02-28 DIAGNOSIS — I67.2 ATHEROSCLEROTIC CEREBROVASCULAR DISEASE: ICD-10-CM

## 2023-02-28 PROCEDURE — G8427 DOCREV CUR MEDS BY ELIG CLIN: HCPCS | Performed by: PSYCHIATRY & NEUROLOGY

## 2023-02-28 PROCEDURE — 1090F PRES/ABSN URINE INCON ASSESS: CPT | Performed by: PSYCHIATRY & NEUROLOGY

## 2023-02-28 PROCEDURE — 1123F ACP DISCUSS/DSCN MKR DOCD: CPT | Performed by: PSYCHIATRY & NEUROLOGY

## 2023-02-28 PROCEDURE — 3017F COLORECTAL CA SCREEN DOC REV: CPT | Performed by: PSYCHIATRY & NEUROLOGY

## 2023-02-28 PROCEDURE — 1101F PT FALLS ASSESS-DOCD LE1/YR: CPT | Performed by: PSYCHIATRY & NEUROLOGY

## 2023-02-28 PROCEDURE — G8536 NO DOC ELDER MAL SCRN: HCPCS | Performed by: PSYCHIATRY & NEUROLOGY

## 2023-02-28 PROCEDURE — G8510 SCR DEP NEG, NO PLAN REQD: HCPCS | Performed by: PSYCHIATRY & NEUROLOGY

## 2023-02-28 PROCEDURE — 99214 OFFICE O/P EST MOD 30 MIN: CPT | Performed by: PSYCHIATRY & NEUROLOGY

## 2023-02-28 PROCEDURE — G8417 CALC BMI ABV UP PARAM F/U: HCPCS | Performed by: PSYCHIATRY & NEUROLOGY

## 2023-02-28 PROCEDURE — G8400 PT W/DXA NO RESULTS DOC: HCPCS | Performed by: PSYCHIATRY & NEUROLOGY

## 2023-02-28 RX ORDER — ATORVASTATIN CALCIUM 20 MG/1
20 TABLET, FILM COATED ORAL DAILY
Qty: 90 TABLET | Refills: 3 | Status: SHIPPED | OUTPATIENT
Start: 2023-02-28

## 2023-02-28 RX ORDER — CYANOCOBALAMIN 1000 UG/ML
INJECTION, SOLUTION INTRAMUSCULAR; SUBCUTANEOUS
COMMUNITY
Start: 2023-01-28

## 2023-02-28 NOTE — LETTER
2/28/2023    Patient: Shahla Grijalva   YOB: 1945   Date of Visit: 2/28/2023     Hawa Wood, [address] [place], [place] [id number]  Via Fax: [phone]    Dear Hawa Wood DO,      Thank you for referring Ms. Mallory Monterroso to [facility] for evaluation. My notes for this consultation are attached. If you have questions, please do not hesitate to call me. I look forward to following your patient along with you.       Sincerely,    Natividad Barillas MD

## 2023-02-28 NOTE — PROGRESS NOTES
1. Have you been to the ER, urgent care clinic since your last visit? Hospitalized since your last visit? No.    2. Have you seen or consulted any other health care providers outside of the [address] [place] [address] since your last visit? Include any pap smears or colon screening. No.      Needing refill on Lipitor.       Chief Complaint   Patient presents with    Cerebrovascular Accident     6 month- pt denies any symptoms

## 2023-02-28 NOTE — PROGRESS NOTES
NEUROLOGY CLINIC NOTE    Patient ID:  Stacey Hoover  [id number]  78 y.o.  1945    Date of Visit:  February 28, 2023    Reason for Visit:  Stroke    Chief Complaint   Patient presents with    Cerebrovascular Accident     6 month- pt denies any symptoms       History of Present Illness:     Patient Active Problem List    Diagnosis Date Noted    Syncope and collapse 12/14/2021    Left arm weakness 12/14/2021     Past Medical History:   Diagnosis Date    Glaucoma     History of mammography, screening 04/23/2019    Normal    Pap smear for cervical cancer screening 04/23/2019    Negative    Stroke [facility])       Past Surgical History:   Procedure Laterality Date    HX ORTHOPAEDIC  1989    lower back surgery    HX ORTHOPAEDIC  2003    cervical neck surgery      Prior to Admission medications    Medication Sig Start Date End Date Taking? Authorizing Provider   cyanocobalamin (VITAMIN B12) 1,000 mcg/mL injection INJECT 1ML MONTHLY 1/28/23  Yes Provider, Historical   ticagrelor (BRILINTA) 60 mg tab tablet Take 1 Tablet by mouth two (2) times a day. 8/30/22  Yes Bonifacio Aaron MD   atorvastatin (LIPITOR) 20 mg tablet Take 1 Tablet by mouth daily. 3/22/22  Yes Bonifacio Aaron MD   LUMIGAN 0.01 % ophthalmic drops INSTILL 1 DROP INTO BOTH EYES EVERY DAY AT NIGHT 2/27/19  Yes Provider, Historical   aspirin 81 mg chewable tablet Take 81 mg by mouth daily. Yes Provider, Historical     Allergies   Allergen Reactions    Penicillins Anaphylaxis    Iodinated Contrast Media Shortness of Breath and Swelling      Social History     Tobacco Use    Smoking status: Never    Smokeless tobacco: Never   Substance Use Topics    Alcohol use: Never      Family History   Problem Relation Age of Onset    Heart Disease Mother     Heart Disease Father         Subjective:      Stacey Hoover is a 78 y.o.  WF with a history of stroke, atherosclerotic cerebrovascular disease, hyperlipidemia and glaucoma who is here for further evaluation after a recent stroke. Patient is here for follow up. Patient was admitted at [facility] last 12/14/2021 for intermittent episodes of dizziness, nausea and near syncopal spells for more than 1 week. Patient complains of dizziness, nausea and near syncopal episodes since 12/4/2021. Patient has been seen in 4 different ERs the last week and wants answers. She was told likely secondary to vertigo and UTI. Taking medications for vertigo with no relief. Patient after receiving contrast dye at the CT scan developed lethargy was clammy, diaphoretic with slurred speech. Possible allergic reaction to contrast.  Solu-Medrol and Benadryl was administered. Patient also given epi intramuscularly. Patient is mentions her PCP having difficulty obtaining blood pressure on the left arm and difficulty palpating a pulse. Patient was sent to the ER for further evaluation. In the ER blood pressure was 113/71. Labs done revealed unremarkable urinalysis, troponin, lipid panel (LDL 92.2), respiratory virus panel with COVID-19 PCR and hemoglobin A1c. CBC showing increased WBC at 12.2 and increased platelet at 965. CMP showing increased glucose at 110 and increased calcium at 10.2. Elevated TSH at 5. Head and neck CTA with contrast revealed bilateral vertebral arteries are diminutive in caliber with severe atherosclerotic changes. Long segment occlusion in the right cervical vertebral artery. Underlying dissection is not excluded. Intracranial V4 segments are patent at the level of PICA. Moderate to severe atherosclerotic changes in the cavernous internal carotid artery. Moderate atherosclerosis in the left posterior cerebral artery. A few biapical lung nodules measuring up to 4 mm with patchy groundglass opacities. Head CT without contrast did not reveal any acute process. Periventricular white matter disease.      Review of records revealed:  Patient was seen at [facility] cardiology 12/10/21  Started having episodic dizziness recently. Has R eye blurriness and been to eye center earlier this week; rec for cataract extraction. Stays dizzy all the time but when it gets worse she gets nausea. Fine went to bed last night. When lies down long time she gets dizzy. Got up 3 times to go bathroom last night and felt fine but when got up this AM she was staggering and felt dizzy unable to stand. Got in shower and got dizzy. It passed spontaneously. Riding in the car is worse with nausea. Worse tilting her head back. Had vertigo years before and took dramaine with good relief. This feels similarly but the blurriness is new. No chest pain. Had cervical spine surgery in the past and has L arm numbness. She has posterior neck pain. Last few years the pain in her neck has gradually worsened. Rare headaches. Can no longer get BP in L arm due to lack of arm pulses. Sleeps a lot and is fatigued. This is unlike her. Taking dramamine and may contribute. Here with daughter who provider collateral history. Spoke to PCP about her condition prior to visit. Patient seen in PCP clinic few days prior after ED visits. (both local and ED visits) Labs reassuring. CT brain without ICP. CT chest without PE. She was orthostatic in the office and passed out upon standing. This prompted local ED visit. BMP normal. covid negative. Echo EF >55% moderate LVH     Patient also seen at [facility] ([place], [place]) in [place] and had negative head CT and positive urinalysis for UTI and was discharged on antibiotics. When seen, patient was still complaining of feeling that her tongue was swollen as well as her lips and some tightness in her throat from the episode last night. Mild slurring of her speech. The next day, patient still complaining of feelings of lightheadedness and still some unsteadiness. Speech is still slightly slurred. Carotid Doppler study did not reveal any significant stenosis.   Brain MRI without contrast revealed small acute infarcts bilateral cerebellar hemisphere and small acute infarct right occipital lobe. Labs done revealed increased WBC 15.2, increased platelet at 136, increased magnesium at 2.5 and unremarkable CMP. Patient was discharged on 12/15/2021 on Plavix 75 mg daily, atorvastatin 20 mg daily and aspirin 81 mg daily. Patient had aspirin and platelet function lab testing done 2/1/2022 and both were efficacious. I call the patient and informed her of the lab results and to continue Aspirin and Plavix. Review of records revealed patient was seen by Dr. Jay Santa at the [address] [facility] stroke clinic last 1/27/2022. Note mentions informing patient of continuing dual antiplatelet therapy for 90 days and then a decision has to be made with regards to single agent afterwards. Note also mentions about loop recorder placement. Then apparently last 2/21/2022, patient had an episode at home where she could not say what she wanted to say and was talking nonsensically. No slurring of speech. She texted her daughter and it was nonsensical.  It lasted for about 1 hour. Son-in-law called EMS and patient was transported to [facility] and then [facility] to [facility] in [address] Labs done revealed unremarkable urinalysis, COVID-19 testing, troponin, CMP, lipid panel (LDL 37), PT, PTT, INR and drug screen. EKG done revealed normal sinus rhythm. Chest x-ray revealed fusion hardware noted over the lower spine but otherwise no acute process. Head CT without contrast did not reveal any acute intracranial abnormality. Also thickening of the paranasal sinuses suggest presence of underlying sinusitis. Carotid ultrasound done revealed minimal bilateral carotid artery occlusive disease. Occlusion of the right vertebral artery and moderate stenosis of the left vertebral artery.   MRA of the head revealed long segment moderate to severe stenosis left posterior cerebral artery, short segment critical stenosis of proximal left cavernous carotid artery. Severe stenosis distal left cavernous carotid artery. Short segment severe stenosis proximal right cavernous carotid artery. Brain MRI done revealed restricted diffusion right occipital lobe, left posterior corpus callosum, left frontal lobe white matter. Periventricular and punctate white matter changes probably secondary to small vessel ischemic disease. Findings consistent with multifocal bilateral cerebral subacute infarcts. Transthoracic echocardiogram done revealed EF of 60 to 65%. No thrombus. Neuro interventional service was consulted and no intervention was recommended. Plavix was discontinued and patient was started on Brilinta. Aspirin was continued. 30-day event monitor was ordered. Advised to follow-up with neurology. Patient was discharged 2/22/2022. Patient was seen at [facility] neurology for follow-up from her stroke last 6/24/2022. Note mentions -30-day event monitoring discussion about paroxysmal atrial fibrillation and potential need for loop recorder. Deferred evaluation for loop recorder implantation. Discussion about discontinuing Brilinta but since patient is doing well, plan was to continue it. Advised switching to rosuvastatin 10 mg daily from atorvastatin 20 mg daily given muscle and joint issues. Since the last visit on 8/30/2022, patient reports she is doing well overall. No recurrence of problems with saying what she wants to say or talking or texting nonsensically. No significant issues with dizziness or vertigo. Walking continues to be better. No falls. Chronic problems with blurring of vision. Less issues with daytime sleepiness. Patient did not have home sleep testing done last 9/20/2022 which showed an overall AHI of 4.6/h with minimal oxygen saturation of 88%. No evidence for significant obstructive sleep apnea. She is compliant with her aspirin 81 mg daily and Brilinta twice a day. She is also taking atorvastatin 20 mg daily.  She drives less than 9 miles. Only local and daytime driving. No issues. Review of records revealed:  Labs done by PCP last 1/27/2023 showed unremarkable CBC, CMP and lipid panel with LDL of 34. Outside reports reviewed: labs    Review of Systems:    A comprehensive review of systems was performed:   Constitutional: positive for none  Eyes: positive for vision issues  Ears, nose, mouth, throat, and face: positive for none  Respiratory: positive for none  Cardiovascular: positive for none  Gastrointestinal: positive for none  Genitourinary: positive for none  Integument/breast: positive for none  Hematologic/lymphatic: positive for none  Musculoskeletal: positive for muscle pain  Neurological: positive for none  Behavioral/Psych: positive for none  Endocrine: positive for none  Allergic/Immunologic: positive for none      Objective:     Visit Vitals  /80 (BP 1 Location: Left upper arm, BP Patient Position: Sitting, BP Cuff Size: Adult)   Pulse 71   Resp 15   Ht 5' 2\" (1.575 m)   Wt 67.1 kg (148 lb)   SpO2 98%   BMI 27.07 kg/m²       PHYSICAL EXAM:    NEUROLOGICAL EXAM:    Appearance: The patient is obese, provides a coherent history and is in no acute distress. Mental Status: Oriented to time, place and person. Fluent, no aphasia or dysarthria. Mood and affect appropriate. Cranial Nerves:   Intact visual fields. SANA, EOM's full, no nystagmus, no ptosis. Facial sensation is normal. Corneal reflexes are intact. Facial movement is symmetric. Decrease hearing bilaterally. Palate is midline with normal elevation. Sternocleidomastoid and trapezius muscles are normal. Tongue is midline. Motor:  5/5 strength in upper and lower proximal and distal muscles. Normal bulk and tone. No fasciculations. No pronator drift. Reflexes:   Deep tendon reflexes 1+/4 and symmetrical. Downgoing toes. Sensory:   Normal to cold, pinprick and vibration. Gait:  Normal gait. No Romberg but with some truncal instability. Problems with tandem walking. Tremor:   No tremor noted. Cerebellar:  Intact FTN/ADRIANA/HTS. 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 No residual cognitive deficits from recent stroke. Previous brain MRI done revealed restricted diffusion right occipital lobe, left posterior corpus callosum, left frontal lobe white matter. Periventricular and punctate white matter changes probably secondary to small vessel ischemic disease. Findings consistent with multifocal bilateral cerebral subacute infarcts. Patient is also status post posterior right occipital and bilateral cerebellar infarcts. Previous head CT without contrast did not reveal any acute process. Periventricular white matter disease. Repeat echocardiogram at [address] [place] [address] was unremarkable. Prior echocardiogram at [facility] did not reveal any PFO. Repeat carotid Doppler studies did not reveal any significant ICA stenosis. Patient again advised to see her ophthalmologist for the vision issues and to also have Abbott's visual field testing done to assess for any visual field defect. Advised to do routine structured physical exercises. Call 911 if new deficits occur. Responding to Aspirin and Brilinta. Continue aspirin 81 mg daily and Brilinta 60 mg BID (total of 90 days) for stroke prophylaxis. Prescription was provided. Aspirin on previous check was efficacious. Call 911 if new deficits occur. Previous MRA of the head revealed long segment moderate to severe stenosis left posterior cerebral artery, short segment critical stenosis of proximal left cavernous carotid artery. Severe stenosis distal left cavernous carotid artery. Short segment severe stenosis proximal right cavernous carotid artery.   Based on most recent MRA there seems to be some improvement as there was no right vertebral artery occlusion seen. Previous head and neck CTA revealed bilateral vertebral arteries that are diminutive in caliber with severe atherosclerotic changes. Long segment occlusion in the right cervical vertebral artery. Moderate to severe atherosclerotic changes in the cavernous internal carotid artery. Moderate atherosclerosis in the left posterior cerebral artery. Continue Aspirin and Brilinta. Patient wary about coming off of [address]. Last LDL was at goal (<70). Responding to atorvastatin. Continue Atorvastatin 10 mg daily. Prescription was provided. Advised compliance with dietary modifications and medications for hyperlipidemia. Previously provided with DASH diet. Improving. No evidence of BHANU on HSAT. Encouraged sleep hygiene. All questions and concerns were answered. This note was created using voice recognition software. Despite editing, there may be syntax errors.

## 2023-08-28 ENCOUNTER — OFFICE VISIT (OUTPATIENT)
Age: 78
End: 2023-08-28
Payer: MEDICARE

## 2023-08-28 VITALS
BODY MASS INDEX: 27.97 KG/M2 | RESPIRATION RATE: 16 BRPM | SYSTOLIC BLOOD PRESSURE: 122 MMHG | DIASTOLIC BLOOD PRESSURE: 72 MMHG | TEMPERATURE: 97.4 F | HEIGHT: 62 IN | HEART RATE: 64 BPM | WEIGHT: 152 LBS | OXYGEN SATURATION: 98 %

## 2023-08-28 DIAGNOSIS — G47.12 IDIOPATHIC HYPERSOMNIA WITHOUT LONG SLEEP TIME: ICD-10-CM

## 2023-08-28 DIAGNOSIS — I69.30 HISTORY OF CVA WITH RESIDUAL DEFICIT: Primary | ICD-10-CM

## 2023-08-28 DIAGNOSIS — I67.2 CEREBRAL ATHEROSCLEROSIS: ICD-10-CM

## 2023-08-28 DIAGNOSIS — E78.2 MIXED HYPERLIPIDEMIA: ICD-10-CM

## 2023-08-28 PROCEDURE — G8427 DOCREV CUR MEDS BY ELIG CLIN: HCPCS | Performed by: PSYCHIATRY & NEUROLOGY

## 2023-08-28 PROCEDURE — G8400 PT W/DXA NO RESULTS DOC: HCPCS | Performed by: PSYCHIATRY & NEUROLOGY

## 2023-08-28 PROCEDURE — 99214 OFFICE O/P EST MOD 30 MIN: CPT | Performed by: PSYCHIATRY & NEUROLOGY

## 2023-08-28 PROCEDURE — G8419 CALC BMI OUT NRM PARAM NOF/U: HCPCS | Performed by: PSYCHIATRY & NEUROLOGY

## 2023-08-28 PROCEDURE — 1036F TOBACCO NON-USER: CPT | Performed by: PSYCHIATRY & NEUROLOGY

## 2023-08-28 PROCEDURE — 1123F ACP DISCUSS/DSCN MKR DOCD: CPT | Performed by: PSYCHIATRY & NEUROLOGY

## 2023-08-28 PROCEDURE — 1090F PRES/ABSN URINE INCON ASSESS: CPT | Performed by: PSYCHIATRY & NEUROLOGY

## 2023-08-28 PROCEDURE — 3017F COLORECTAL CA SCREEN DOC REV: CPT | Performed by: PSYCHIATRY & NEUROLOGY

## 2023-08-28 ASSESSMENT — PATIENT HEALTH QUESTIONNAIRE - PHQ9
SUM OF ALL RESPONSES TO PHQ QUESTIONS 1-9: 0
1. LITTLE INTEREST OR PLEASURE IN DOING THINGS: 0
SUM OF ALL RESPONSES TO PHQ QUESTIONS 1-9: 0
SUM OF ALL RESPONSES TO PHQ9 QUESTIONS 1 & 2: 0
2. FEELING DOWN, DEPRESSED OR HOPELESS: 0
SUM OF ALL RESPONSES TO PHQ QUESTIONS 1-9: 0
SUM OF ALL RESPONSES TO PHQ QUESTIONS 1-9: 0

## 2023-08-28 NOTE — PATIENT INSTRUCTIONS
Patient Education        Learning About How to Prevent a Stroke  What is a stroke? A stroke is damage to the brain that occurs when a blood vessel in the brain bursts or is blocked by a blood clot. Without blood and the oxygen it carries, part of the brain starts to die. The part of the body controlled by the damaged area of the brain can't work properly. Brain damage can start within minutes of a stroke. But quick treatment can help limit the damage and increase the chance of a full recovery. What puts you at risk for stroke? A risk factor is anything that makes you more likely to have a particular health problem. Risk factors for stroke that you can manage or change include:  Health problems like atrial fibrillation, diabetes, high blood pressure, high cholesterol, hardening of the arteries (atherosclerosis), and sickle cell disease. Smoking. Drinking more than 2 alcoholic drinks a day for men and 1 drink a day for women. Being overweight. Not eating healthy foods. Not getting enough physical activity. Risk factors you can't change include:  Having a previous stroke. Family history of stroke. Being older. Being [place] American, [place] Native, Native [place], or [address]. Being female. Having certain problems during pregnancy, such as preeclampsia. Being past menopause. Your doctor can help you know your risk. Then you and your doctor can talk about whether to take steps to lower it. How can you help prevent a stroke? Here are some things you can do to help prevent a stroke. Manage health problems that raise your risk. These include atrial fibrillation, diabetes, high blood pressure, and high cholesterol. Have a heart-healthy lifestyle. Don't smoke. If you need help quitting, talk to your doctor about stop-smoking programs and medicines. These can increase your chances of quitting for good. Limit alcohol to 2 drinks a day for men and 1 drink a day for women.   Stay at a healthy

## 2024-01-10 NOTE — DISCHARGE INSTRUCTIONS
HOSPITALIST DISCHARGE INSTRUCTIONS  NAME: Aron Brand   :  1945   MRN:  [id number]     Date/Time:  12/15/2021 2:15 PM    ADMIT DATE: 2021     DISCHARGE DATE: 12/15/2021     ADMITTING DIAGNOSIS:  Stroke   Dizziness    DISCHARGE DIAGNOSIS:  Stroke   Dizziness    Patient Education     Stroke: Care Instructions  Your Care Instructions     You have had a stroke. This means that the blood flow to a part of your brain was blocked for some time, which damages the nerve cells in that part of the brain. The part of your body controlled by that part of your brain may not function properly now. The brain is an amazing organ that can heal itself to some degree. The stroke you had damaged part of your brain. But other parts of your brain may take over in some way for the damaged areas. You have already started this process. Your doctor will talk with you about what you can do to prevent another stroke. High blood pressure, high cholesterol, and diabetes are all risk factors for stroke. If you have any of these conditions, work with your doctor to make sure they are under control. Other risk factors for stroke include being overweight, smoking, and not getting regular exercise. Going home may be hard for you and your family. The more you can try to do for yourself, the better. Remember to take each day one at a time. Follow-up care is a key part of your treatment and safety. Be sure to make and go to all appointments, and call your doctor if you are having problems. It's also a good idea to know your test results and keep a list of the medicines you take. How can you care for yourself at home?    · Enter a stroke rehabilitation (rehab) program, if your doctor recommends it.  Physical, speech, and occupational therapies can help you manage bathing, dressing, eating, and other basics of daily living.     · Do not drive until your doctor says it is okay.     · It is normal to feel sad or depressed after a stroke. If these feelings last, talk to your doctor.     · If you are having problems with urine leakage, go to the bathroom at regular times, including when you first wake up and at bedtime. Also, limit fluids after dinner.     · If you are constipated, drink plenty of fluids. If you have kidney, heart, or liver disease and have to limit fluids, talk with your doctor before you increase the amount of fluids you drink. Set up a regular time for using the toilet. If you continue to have constipation, your doctor may suggest using a bulking agent, such as Metamucil, or a stool softener, laxative, or enema. Medicines    · Take your medicines exactly as prescribed. Call your doctor if you think you are having a problem with your medicine. You may be taking several medicines. ACE (angiotensin-converting enzyme) inhibitors, angiotensin II receptor blockers (ARBs), beta-blockers, diuretics (water pills), and calcium channel blockers control your blood pressure. Statins help lower cholesterol. Your doctor may also prescribe medicines for depression, pain, sleep problems, anxiety, or agitation.     · If your doctor has given you a blood thinner to prevent another stroke, be sure you get instructions about how to take your medicine safely. Blood thinners can cause serious bleeding problems.     · Do not take any over-the-counter medicines or herbal products without talking to your doctor first.     · If you take birth control pills or hormone therapy, talk to your doctor about whether they are right for you. For family members and caregivers    · Make the home safe. Set up a room so that your loved one does not have to climb stairs. Be sure the bathroom is on the same floor. Move throw rugs and furniture that could cause falls. Make sure that the lighting is good. Put grab bars and seats in tubs and showers.     · Find out what your loved one can do and what they need help with.  Try not to do things for your loved one that your loved one can do on their own. Help them learn and practice new skills.     · Visit and talk with your loved one often. Try doing activities together that you both enjoy, such as playing cards or board games. Keep in touch with your loved one's friends as much as you can. Encourage them to visit.     · Take care of yourself. Do not try to do everything yourself. Ask other family members to help. Eat well, get enough rest, and take time to do things that you enjoy. Keep up with your own doctor visits, and make sure to take your medicines regularly. Get out of the house as much as you can. Join a local support group. Find out if you qualify for home health care visits to help with rehab or for adult day care. When should you call for help? Call 911 anytime you think you may need emergency care. For example, call if:    · You have signs of another stroke. These may include:  ? Sudden numbness, tingling, weakness, or loss of movement in your face, arm, or leg, especially on only one side of your body. ? Sudden vision changes. ? Sudden trouble speaking. ? Sudden confusion or trouble understanding simple statements. ? Sudden problems with walking or balance. ? A sudden, severe headache that is different from past headaches. Call 911 even if these symptoms go away in a few minutes. Call your doctor now or seek immediate medical care if:    · You have new symptoms that may be related to your stroke, such as falls or trouble swallowing. Watch closely for changes in your health, and be sure to contact your doctor if you have any problems. Where can you learn more? Go to http://www.gray.com/  Enter C294 in the search box to learn more about \"Stroke: Care Instructions. \"  Current as of: July 6, 2021               Content Version: 13.0  © 6580-2496 Healthwise, Incorporated.    Care instructions adapted under license by [employer] (which disclaims liability or warranty for this information). If you have questions about a medical condition or this instruction, always ask your healthcare professional. [place] [address] any warranty or liability for your use of this information. DMV Transient Ischemic Attack and/or Cerebral Vascular Accident Policy   It is the policy of the Department of Motor Vehicles, based on guidance and recommendations from the [address], that if a  suffers a Transient Ischemic Attack (TIA), the s privilege to operate a motor vehicle will be suspended for three months. The three-month suspension may be shortened for drivers who have suffered a TIA, provided that treatment has been provided mitigating the risk of reoccurrence and there is no impact on a s ability to operate a motor vehicle safely. These cases may be referred to the [address] for their guidance and recommendations. If a  suffers a Cerebral Vascular Accident (CVA), the s privilege to operate a motor vehicle will be suspended for six months. This six-month suspension period may be shortened if V receives information from the 's health care provider indicating that the  has fully recovered. These cases may be referred to the [address] for its guidance and recommendations. Following the six-month suspension, [facility] will request an evaluation with a certified  rehabilitation specialist if the  has suffered paralysis or cognitive changes due to the CVA. If the s physical and cognitive information is not indicated in the medical report received by the agency, [facility] will request it from the health care provider.    ([employer]ues.[employer]. asp). Based on the information received about the s cognitive abilities, the  may also be subject to the [facility].   The  is also required to furnish an updated Vision Report with an examination date that is not older than 90 days and occurs after the TIA/CVA, in accordance with [place] Code Section 46.2-311. DMV may impose additional requirements on the individual depending on the information received by the agency. https://www.reyes-sanchez.com/. asp    MEDICATIONS:     · It is important that you take the medication exactly as they are prescribed. · Keep your medication in the bottles provided by the pharmacist and keep a list of the medication names, dosages, and times to be taken in your wallet. · Do not take other medications without consulting your doctor     Pain Management: per above medications    What to do at Home    Recommended diet:  Low fat, Low cholesterol    Recommended activity: No driving     1) Return to the hospital if you feel worse    2) If you experience any of the following symptoms then please call your primary care physician or return to the emergency room if you cannot get hold of your doctor:  Fever, chills, nausea, vomiting, diarrhea, change in mentation, falling, bleeding, shortness of breath, chest pain, severe headache, severe abdominal pain. Follow Up:   Follow-up Information     Follow up With Specialties Details Why Contact Info    [facility]  [facility]gent Care in the home [address]  [address] [place] [address], [address] [address] [place] [address], DO Family Medicine Schedule an appointment as soon as possible for a visit in 1 week Hospital Follow Up, please obtain follow up CBC within 1 week  [address] [facility] [address]      Katie Trinh MD Neurology Schedule an appointment as soon as possible for a visit in 4 weeks Hospital Follow Up with neurology [address]  [address] [facility]      Gisele Castellanos MD Endovascular Surgical Neuroradiology Schedule an appointment as soon as possible for a visit As needed Tobias Miła [address] [place] [address] [address] [place] Dori Mayer MD Neurology Schedule an appointment as soon as possible for a visit in 1 week please follow up with neuro ophthamology [address]  [address] [facility]  [phone]         Please follow up with your neurosurgeon and orthopedics as soon as you can. No driving due to your stroke. [facility] [address] [place] [address] Pulmonary Clinic  Schedule an appointment as soon as possible for a visit in 2 weeks follow up pulmonary nodules  [facility] [address]  [phone]            Information obtained by :  I understand that if any problems occur once I am at home I am to contact my physician. I understand and acknowledge receipt of the instructions indicated above.                                                                                                                                            Physician's or R.N.'s Signature                                                                  Date/Time                                                                                                                                              Patient or Representative Signature                                                          Date/Time Detail Level: Detailed

## 2024-03-25 ENCOUNTER — TELEPHONE (OUTPATIENT)
Age: 79
End: 2024-03-25

## 2024-03-25 NOTE — TELEPHONE ENCOUNTER
Spoke with patient,  verified, and informed per Dr. Bautista, please discuss with surgeon who will be doing her cataract surgery.

## 2024-03-25 NOTE — TELEPHONE ENCOUNTER
Patient would like to know if it is okay for her to take her medication Brilinta tomorrow 03/26/24 before her Cataract surgery at 1:30pm?

## 2024-03-26 RX ORDER — ATORVASTATIN CALCIUM 20 MG/1
20 TABLET, FILM COATED ORAL DAILY
Qty: 90 TABLET | Refills: 3 | Status: SHIPPED | OUTPATIENT
Start: 2024-03-26

## 2024-08-27 ENCOUNTER — OFFICE VISIT (OUTPATIENT)
Age: 79
End: 2024-08-27
Payer: MEDICARE

## 2024-08-27 VITALS
SYSTOLIC BLOOD PRESSURE: 124 MMHG | DIASTOLIC BLOOD PRESSURE: 78 MMHG | OXYGEN SATURATION: 98 % | WEIGHT: 164 LBS | RESPIRATION RATE: 18 BRPM | TEMPERATURE: 97.4 F | HEIGHT: 62 IN | BODY MASS INDEX: 30.18 KG/M2 | HEART RATE: 77 BPM

## 2024-08-27 DIAGNOSIS — E78.5 HYPERLIPIDEMIA LDL GOAL <70: ICD-10-CM

## 2024-08-27 DIAGNOSIS — I69.30 HISTORY OF CVA WITH RESIDUAL DEFICIT: Primary | ICD-10-CM

## 2024-08-27 DIAGNOSIS — I67.2 CEREBRAL ATHEROSCLEROSIS: ICD-10-CM

## 2024-08-27 PROCEDURE — G8417 CALC BMI ABV UP PARAM F/U: HCPCS | Performed by: PSYCHIATRY & NEUROLOGY

## 2024-08-27 PROCEDURE — 99214 OFFICE O/P EST MOD 30 MIN: CPT | Performed by: PSYCHIATRY & NEUROLOGY

## 2024-08-27 PROCEDURE — G8427 DOCREV CUR MEDS BY ELIG CLIN: HCPCS | Performed by: PSYCHIATRY & NEUROLOGY

## 2024-08-27 PROCEDURE — 1123F ACP DISCUSS/DSCN MKR DOCD: CPT | Performed by: PSYCHIATRY & NEUROLOGY

## 2024-08-27 PROCEDURE — 1036F TOBACCO NON-USER: CPT | Performed by: PSYCHIATRY & NEUROLOGY

## 2024-08-27 PROCEDURE — 1090F PRES/ABSN URINE INCON ASSESS: CPT | Performed by: PSYCHIATRY & NEUROLOGY

## 2024-08-27 PROCEDURE — G8400 PT W/DXA NO RESULTS DOC: HCPCS | Performed by: PSYCHIATRY & NEUROLOGY

## 2024-08-27 NOTE — PROGRESS NOTES
NEUROLOGY CLINIC NOTE    Patient ID:  Nayeli Birch  [id number]  78 y.o.  1945    Date of Visit:  August 27, 2024    Reason for Visit:  Stroke    Chief Complaint   Patient presents with    Cerebrovascular Accident     1 year follow up-patient reports she is doing fine and no complaints. Patient states gait is better.        History of Present Illness:     Patient Active Problem List   Diagnosis    Syncope and collapse    Left arm weakness     Past Medical History:   Diagnosis Date    Glaucoma     Hearing loss     History of mammography, screening 04/23/2019    Normal    Pap smear for cervical cancer screening 04/23/2019    Negative    Stroke (HCC)      Past Surgical History:   Procedure Laterality Date    ORTHOPEDIC SURGERY  1989    lower back surgery    ORTHOPEDIC SURGERY  2003    cervical neck surgery     Current Outpatient Medications on File Prior to Visit   Medication Sig Dispense Refill    atorvastatin (LIPITOR) 20 MG tablet TAKE 1 TABLET BY MOUTH EVERY DAY 90 tablet 3    ticagrelor (BRILINTA) 60 MG TABS tablet Take 1 tablet by mouth 2 times daily 180 tablet 3    aspirin 81 MG chewable tablet Take 1 tablet by mouth daily      bimatoprost (LUMIGAN) 0.01 % SOLN ophthalmic drops INSTILL 1 DROP INTO BOTH EYES EVERY DAY AT NIGHT      cyanocobalamin 1000 MCG/ML injection INJECT 1ML MONTHLY       No current facility-administered medications on file prior to visit.     Allergies   Allergen Reactions    Iodinated Contrast Media Shortness Of Breath and Swelling    Penicillins Anaphylaxis     Social History     Tobacco Use    Smoking status: Never    Smokeless tobacco: Never   Substance Use Topics    Alcohol use: Never    Drug use: Never     Family History   Problem Relation Age of Onset    Heart Disease Father     Heart Disease Mother              Subjective:      Nayeli Birch is a 78 y.o. WF with a history of stroke, atherosclerotic cerebrovascular disease, hyperlipidemia and glaucoma who is here for

## 2024-08-27 NOTE — PATIENT INSTRUCTIONS
Patient Education        Learning About How to Prevent a Stroke  What is a stroke?  A stroke is damage to the brain that occurs when a blood vessel in the brain bursts or is blocked by a blood clot. Without blood and the oxygen it carries, part of the brain starts to die. The part of the body controlled by the damaged area of the brain can't work properly.  Brain damage can start within minutes of a stroke. But quick treatment can help limit the damage and increase the chance of a full recovery.  What puts you at risk for stroke?  A risk factor is anything that makes you more likely to have a particular health problem.  Risk factors for stroke that you can manage or change include:  Health problems like atrial fibrillation, diabetes, high blood pressure, high cholesterol, hardening of the arteries (atherosclerosis), and sickle cell disease.  Smoking.  Drinking more than 2 alcoholic drinks a day for men and 1 drink a day for women.  Being overweight.  Not eating healthy foods.  Not getting enough physical activity.  Risk factors you can't change include:  Having a previous stroke.  Family history of stroke.  Being older.  Being , [place] Native, , or South  American.  Being female.  Having certain problems during pregnancy, such as preeclampsia.  Being past menopause.  Your doctor can help you know your risk. Then you and your doctor can talk about whether to take steps to lower it.  How can you help prevent a stroke?  Here are some things you can do to help prevent a stroke.  Manage health problems that raise your risk. These include atrial fibrillation, diabetes, high blood pressure, and high cholesterol.  Have a heart-healthy lifestyle.  Don't smoke. If you need help quitting, talk to your doctor about stop-smoking programs and medicines. These can increase your chances of quitting for good.  Limit alcohol to 2 drinks a day for men and 1 drink a day for women.  Stay at a healthy  problems  You can help lower your chance of having another stroke by managing certain other health problems. Problems that increase your risk of having another stroke include:  Atrial fibrillation.  Carotid artery disease.  Diabetes.  High blood pressure.  High cholesterol.  If you have any of these health problems, you can manage them with a healthy lifestyle along with medicine.  If you think you may have a problem with alcohol or drug use, talk to your doctor. This includes prescription medicines (such as amphetamines and opioids) and illegal drugs (such as cocaine and methamphetamine). Your doctor can help you figure out what type of treatment is best for you.  Have a heart-healthy lifestyle  Do not smoke or allow others to smoke around you. If you need help quitting, talk to your doctor about stop-smoking programs and medicines. These can increase your chances of quitting for good. Smoking makes a stroke more likely.  Limit alcohol to 2 drinks a day for men and 1 drink a day for women.  Stay at a healthy weight. Lose weight if you need to. Managing your weight will help you keep your heart and body healthy.  Be active.  Ask your doctor what type and level of activity is safe for you.  Eat heart-healthy foods. These include vegetables, fruits, nuts, beans, lean meat, fish, and whole grains. Limit sodium and sugar.  It's also important to:  Get vaccinated against COVID-19, the flu, and pneumonia.  Ask for help if you think you are depressed.  Do stroke rehab  Taking part in a stroke rehabilitation (rehab) program also helps you take steps to prevent another stroke. Rehab can help you recover, prevent problems, and help you to regain skills you lost or make the most of your abilities after a stroke.  Your rehab team will give you education and support to help you build new, healthy habits. You'll learn how to manage any other health problems that you might have. You'll also learn how to exercise safely, eat a

## 2025-03-17 RX ORDER — ATORVASTATIN CALCIUM 20 MG/1
20 TABLET, FILM COATED ORAL DAILY
Qty: 90 TABLET | Refills: 3 | Status: SHIPPED | OUTPATIENT
Start: 2025-03-17

## 2025-08-29 ENCOUNTER — OFFICE VISIT (OUTPATIENT)
Age: 80
End: 2025-08-29
Payer: MEDICARE

## 2025-08-29 VITALS
HEART RATE: 68 BPM | DIASTOLIC BLOOD PRESSURE: 76 MMHG | SYSTOLIC BLOOD PRESSURE: 124 MMHG | TEMPERATURE: 97.9 F | WEIGHT: 169 LBS | BODY MASS INDEX: 31.1 KG/M2 | RESPIRATION RATE: 16 BRPM | HEIGHT: 62 IN

## 2025-08-29 DIAGNOSIS — I69.30 HISTORY OF CVA WITH RESIDUAL DEFICIT: Primary | ICD-10-CM

## 2025-08-29 DIAGNOSIS — E78.5 HYPERLIPIDEMIA LDL GOAL <70: ICD-10-CM

## 2025-08-29 DIAGNOSIS — I67.2 CEREBRAL ATHEROSCLEROSIS: ICD-10-CM

## 2025-08-29 PROCEDURE — 1090F PRES/ABSN URINE INCON ASSESS: CPT | Performed by: PSYCHIATRY & NEUROLOGY

## 2025-08-29 PROCEDURE — 4004F PT TOBACCO SCREEN RCVD TLK: CPT | Performed by: PSYCHIATRY & NEUROLOGY

## 2025-08-29 PROCEDURE — 1159F MED LIST DOCD IN RCRD: CPT | Performed by: PSYCHIATRY & NEUROLOGY

## 2025-08-29 PROCEDURE — G8427 DOCREV CUR MEDS BY ELIG CLIN: HCPCS | Performed by: PSYCHIATRY & NEUROLOGY

## 2025-08-29 PROCEDURE — 99214 OFFICE O/P EST MOD 30 MIN: CPT | Performed by: PSYCHIATRY & NEUROLOGY

## 2025-08-29 PROCEDURE — G8417 CALC BMI ABV UP PARAM F/U: HCPCS | Performed by: PSYCHIATRY & NEUROLOGY

## 2025-08-29 PROCEDURE — 1126F AMNT PAIN NOTED NONE PRSNT: CPT | Performed by: PSYCHIATRY & NEUROLOGY

## 2025-08-29 PROCEDURE — 1123F ACP DISCUSS/DSCN MKR DOCD: CPT | Performed by: PSYCHIATRY & NEUROLOGY

## 2025-08-29 PROCEDURE — G8400 PT W/DXA NO RESULTS DOC: HCPCS | Performed by: PSYCHIATRY & NEUROLOGY

## 2025-08-29 RX ORDER — MULTIVIT-MIN/IRON/FOLIC ACID/K 18-600-40
2000 CAPSULE ORAL DAILY
COMMUNITY

## 2025-08-29 RX ORDER — TICAGRELOR 60 MG/1
60 TABLET, FILM COATED ORAL 2 TIMES DAILY
Qty: 180 TABLET | Refills: 3 | Status: SHIPPED | OUTPATIENT
Start: 2025-08-29

## 2025-08-29 ASSESSMENT — PATIENT HEALTH QUESTIONNAIRE - PHQ9
1. LITTLE INTEREST OR PLEASURE IN DOING THINGS: NOT AT ALL
SUM OF ALL RESPONSES TO PHQ QUESTIONS 1-9: 0
2. FEELING DOWN, DEPRESSED OR HOPELESS: NOT AT ALL
SUM OF ALL RESPONSES TO PHQ QUESTIONS 1-9: 0